# Patient Record
Sex: FEMALE | Race: WHITE | NOT HISPANIC OR LATINO | Employment: FULL TIME | URBAN - METROPOLITAN AREA
[De-identification: names, ages, dates, MRNs, and addresses within clinical notes are randomized per-mention and may not be internally consistent; named-entity substitution may affect disease eponyms.]

---

## 2017-03-27 ENCOUNTER — TELEPHONE (OUTPATIENT)
Dept: FAMILY MEDICINE | Facility: CLINIC | Age: 52
End: 2017-03-27

## 2017-03-27 DIAGNOSIS — Z12.11 SPECIAL SCREENING FOR MALIGNANT NEOPLASMS, COLON: Primary | ICD-10-CM

## 2017-03-27 DIAGNOSIS — Z12.11 COLON CANCER SCREENING: ICD-10-CM

## 2017-03-27 NOTE — TELEPHONE ENCOUNTER
Panel Management Review      Patient has the following on her problem list:       Composite cancer screening  Chart review shows that this patient is due/due soon for the following Fecal Colorectal (FIT)  Summary:    Patient is due/failing the following:   FIT    Action needed:   Patient needs referral/order: signed     Type of outreach:    Sent Tripda message.    Questions for provider review:    Please sign future lab order                                                                                                                                    Olya Winkler CMA       Chart routed to Provider .

## 2017-03-28 ENCOUNTER — OFFICE VISIT (OUTPATIENT)
Dept: OBGYN | Facility: CLINIC | Age: 52
End: 2017-03-28
Payer: COMMERCIAL

## 2017-03-28 VITALS
HEART RATE: 108 BPM | SYSTOLIC BLOOD PRESSURE: 113 MMHG | DIASTOLIC BLOOD PRESSURE: 78 MMHG | TEMPERATURE: 98.3 F | BODY MASS INDEX: 32.59 KG/M2 | HEIGHT: 60 IN | WEIGHT: 166 LBS

## 2017-03-28 DIAGNOSIS — R10.2 PELVIC PAIN IN FEMALE: Primary | ICD-10-CM

## 2017-03-28 PROCEDURE — 99213 OFFICE O/P EST LOW 20 MIN: CPT | Performed by: NURSE PRACTITIONER

## 2017-03-28 ASSESSMENT — PAIN SCALES - GENERAL: PAINLEVEL: MODERATE PAIN (4)

## 2017-03-28 NOTE — PROGRESS NOTES
S: Pt is a 51 year old  5 para 4 who scheduled an appointment today for an annual female exam, but is wanting evaluation for pelvic pain and will reschedule her AFE. Her pelvic pain has been intermittent x 1 year. It is generalized across the lower pelvis. Comes and goes, can be more prominent with walking, getting up from bed. Not related to many activities. Does not occur daily, but occurs more than weekly. No pattern to when it occurs. No other aggravating factors or alleviating factors. Not usually bothersome, just noticeable. Describes as cramping. No sharp, shooting, stabbing pains. Occasional NSAID use and that does help. Has a history of constipation since she was young, but knows that discomfort and it is different than this. Denies nausea, vomiting, appetite changes, bowel or urinary changes, abnormal vaginal symptoms. No vaginal bleeding. LMP was in . Does have a history of uterine fibroids. No contributory family history. Patient medical, surgical, social, and family history reviewed and updated at today's visit. ROS: 10 point ROS neg other than the symptoms noted above in the HPI.    O: This is a well appearing female in no acute distress. Answers questions and maintains eye contact appropriately. Vital signs noted.  RESPIRATORY: Clear to auscultation bilaterally.  CV: Regular rate and rhythm without murmur, gallop, rub  ABDOMEN: Soft, nondistended, normoactive bowel sounds. No hepatosplenomegaly. No guarding, rebounding, or rigidity. Mild right lower quadrant tenderness, no left sided symptoms.  Vulva: No external lesions, normal hair distribution, no adenopathy  BUS:  Normal, no masses noted  Vagina: Moist, pink, no abnormal discharge, well rugated, no lesions  Cervix: Pink, parous, midline. Without cervical motion tenderness.  Uterus: Normal size and shape, non-tender, mobile  Ovaries: No masses, tenderness present bilaterally-R>L, mobile    A/P:  (R10.2) Pelvic pain in female  (primary  encounter diagnosis)  Comment: We reviewed possible etiologies of her symptoms including that this may not be gynecologic in nature. Plan ultrasound for further evaluation and if normal, then recommend follow up with PCP. Continue NSAIDS PRN until ultrasound completed. Warning signs to monitor for and report immediately discussed with patient and she verbalizes understanding. Return to clinic at convenience for annual exam.  Plan: US Pelvic Complete with Transvaginal          Ramona WRIGHT CNP

## 2017-03-28 NOTE — NURSING NOTE
Chief Complaint   Patient presents with     Physical       Initial /78  Pulse 108  Temp 98.3  F (36.8  C) (Oral)  Ht 5' (1.524 m)  Wt 166 lb (75.3 kg)  LMP 11/05/2010  BMI 32.42 kg/m2 Estimated body mass index is 32.42 kg/(m^2) as calculated from the following:    Height as of this encounter: 5' (1.524 m).    Weight as of this encounter: 166 lb (75.3 kg)..  BP completed using cuff size: regular    Last pap : 03/04/2015 SHAYE Patel CMA

## 2017-03-28 NOTE — MR AVS SNAPSHOT
After Visit Summary   3/28/2017    Mag Salcedo    MRN: [id number]           Patient Information     Date Of Birth          1965        Visit Information        Provider Department      3/28/2017 4:10 PM Ramona Riley APRN CNP [facility]        Today's Diagnoses     Pelvic pain in female    -  1       Follow-ups after your visit        Future tests that were ordered for you today     Open Future Orders        Priority Expected Expires Ordered    US Pelvic Complete with Transvaginal Routine  6/26/2017 3/28/2017            Who to contact     If you have questions or need follow up information about today's clinic visit or your schedule please contact [facility] directly at [phone].  Normal or non-critical lab and imaging results will be communicated to you by Sponsiahart, letter or phone within 4 business days after the clinic has received the results. If you do not hear from us within 7 days, please contact the clinic through Sponsiahart or phone. If you have a critical or abnormal lab result, we will notify you by phone as soon as possible.  Submit refill requests through [employer] or call your pharmacy and they will forward the refill request to us. Please allow 3 business days for your refill to be completed.          Additional Information About Your Visit        MyChart Information     [employer] gives you secure access to your electronic health record. If you see a primary care provider, you can also send messages to your care team and make appointments. If you have questions, please call your primary care clinic.  If you do not have a primary care provider, please call [phone] and they will assist you.        Care EveryWhere ID     This is your Care EveryWhere ID. This could be used by other organizations to access your [place] medical records  [id number]        Your Vitals Were     Pulse Temperature Height Last Period BMI (Body Mass Index)       108  98.3  F (36.8  C) (Oral) 5' (1.524 m) 11/05/2010 32.42 kg/m2        Blood Pressure from Last 3 Encounters:   03/28/17 113/78   11/01/16 127/83   04/16/16 102/77    Weight from Last 3 Encounters:   03/28/17 166 lb (75.3 kg)   11/01/16 168 lb (76.2 kg)   04/16/16 169 lb (76.7 kg)                 Today's Medication Changes          These changes are accurate as of: 3/28/17 11:59 PM.  If you have any questions, ask your nurse or doctor.               Stop taking these medicines if you haven't already. Please contact your care team if you have questions.     amitriptyline 25 MG tablet   Commonly known as:  ELAVIL   Stopped by:  Ramona Riley APRN CNP                    Primary Care Provider Office Phone # Fax #    ePdro Dilshad Vela [phone]       [facility] [address] [facility] [address]        Thank you!     Thank you for choosing [facility]  for your care. Our goal is always to provide you with excellent care. Hearing back from our patients is one way we can continue to improve our services. Please take a few minutes to complete the written survey that you may receive in the mail after your visit with us. Thank you!             Your Updated Medication List - Protect others around you: Learn how to safely use, store and throw away your medicines at www.disposemymeds.org.          This list is accurate as of: 3/28/17 11:59 PM.  Always use your most recent med list.                   Brand Name Dispense Instructions for use    buPROPion 100 MG 12 hr tablet    WELLBUTRIN SR    360 tablet    Take 2 tablets (200 mg) by mouth 2 times daily For depression       * clonazePAM 1 MG tablet    klonoPIN    8 tablet    Take 1 tablet (1 mg) by mouth nightly as needed for anxiety Need to be seen for more       * clonazePAM 1 MG tablet    klonoPIN    30 tablet    Take 1 tablet (1 mg) by mouth nightly as needed for anxiety Max#7/week       * Notice:  This list has 2  medication(s) that are the same as other medications prescribed for you. Read the directions carefully, and ask your doctor or other care provider to review them with you.

## 2017-04-04 PROCEDURE — 82274 ASSAY TEST FOR BLOOD FECAL: CPT | Performed by: FAMILY MEDICINE

## 2017-04-06 DIAGNOSIS — Z12.11 COLON CANCER SCREENING: ICD-10-CM

## 2017-04-06 LAB — HEMOCCULT STL QL IA: NEGATIVE

## 2017-05-15 ENCOUNTER — RADIANT APPOINTMENT (OUTPATIENT)
Dept: ULTRASOUND IMAGING | Facility: CLINIC | Age: 52
End: 2017-05-15
Attending: NURSE PRACTITIONER
Payer: COMMERCIAL

## 2017-05-15 DIAGNOSIS — R10.2 PELVIC PAIN IN FEMALE: ICD-10-CM

## 2017-05-15 PROCEDURE — 76856 US EXAM PELVIC COMPLETE: CPT

## 2017-05-22 ENCOUNTER — ALLIED HEALTH/NURSE VISIT (OUTPATIENT)
Dept: NURSING | Facility: CLINIC | Age: 52
End: 2017-05-22
Payer: COMMERCIAL

## 2017-05-22 DIAGNOSIS — H61.23 BILATERAL IMPACTED CERUMEN: Primary | ICD-10-CM

## 2017-05-22 PROCEDURE — 69209 REMOVE IMPACTED EAR WAX UNI: CPT | Mod: 50

## 2017-05-22 NOTE — PROGRESS NOTES
Mag Salcedo is a 51 year old female who presents in clinic for possible impacted cerumen.    SYMPTOMS:  Onset of symptoms: days ago with sudden onset and gradual onset    Hx of cerumen impaction?   Yes  Hx of surgery or rupture?  No (if yes, huddle with provider)  OBJECTIVE:  Patient appears in no distress  HEENT: both sides ear canal occluded with cerumen.      RN check prior to irrigation:Kathleen Roman RN  RN check post irrigation: Kathleen Roman RN    PLAN:  Cerumenosis is noted Wax is removed by syringing with Elephant Ear Wash and manual debridement with 1/2 strength water and 1/2 strength H202       Instructions for home care to prevent wax buildup are given, including OTC agents  Tilt head sideways and instill 5-10 drops twice daily up to 4 days, tip of applicator should not enter ear canal; keep drops in ear for several minutes by keeping head tilted and placing cotton in ear.    NURSING PLAN:  Unable to remove wax.    RECOMMENDED DISPOSITION:    Will comply with recommendation:   Violette Hinojosa MA            Unable to remove the ear wax from either side after numerous attempts by MA as per above.  Patient instructed NOT to use Q tips.  She was having daughter move wax side to side with qtip so she could hear.  Told her nothing smaller than her thumb in ear.  Instructed to use the OTC ear drops per  instructions for next 5-7 days and then make appointment to come back to try to remove the wax once it's softened up.  She states that this has been an ongoing issue and that OTC ear drops for wax removal don't work for her.  Did tell her she could talk with her pcp about referring her to ENT for the build up of cerumen in her ears.  Kathleen Roman RN

## 2017-05-22 NOTE — MR AVS SNAPSHOT
After Visit Summary   5/22/2017    Mag Salcedo    MRN: [id number]           Patient Information     Date Of Birth          1965        Visit Information        Provider Department      5/22/2017 5:30 PM AN ANCILLARY [facility]        Today's Diagnoses     Bilateral impacted cerumen    -  1       Follow-ups after your visit        Who to contact     If you have questions or need follow up information about today's clinic visit or your schedule please contact [facility] directly at [phone].  Normal or non-critical lab and imaging results will be communicated to you by MyChart, letter or phone within 4 business days after the clinic has received the results. If you do not hear from us within 7 days, please contact the clinic through Planexhart or phone. If you have a critical or abnormal lab result, we will notify you by phone as soon as possible.  Submit refill requests through [employer] or call your pharmacy and they will forward the refill request to us. Please allow 3 business days for your refill to be completed.          Additional Information About Your Visit        MyChart Information     [employer] gives you secure access to your electronic health record. If you see a primary care provider, you can also send messages to your care team and make appointments. If you have questions, please call your primary care clinic.  If you do not have a primary care provider, please call [phone] and they will assist you.        Care EveryWhere ID     This is your Care EveryWhere ID. This could be used by other organizations to access your [place] medical records  [id number]        Your Vitals Were     Last Period                   11/05/2010            Blood Pressure from Last 3 Encounters:   05/23/17 113/81   03/28/17 113/78   11/01/16 127/83    Weight from Last 3 Encounters:   05/23/17 170 lb (77.1 kg)   03/28/17 166 lb (75.3 kg)   11/01/16 168 lb (76.2 kg)               Today, you had the following     No orders found for display       Primary Care Provider Office Phone # Fax #    Pedro Vela [phone]       [facility] [address] [facility] [address]        Thank you!     Thank you for choosing [facility]  for your care. Our goal is always to provide you with excellent care. Hearing back from our patients is one way we can continue to improve our services. Please take a few minutes to complete the written survey that you may receive in the mail after your visit with us. Thank you!             Your Updated Medication List - Protect others around you: Learn how to safely use, store and throw away your medicines at www.disposemymeds.org.      Notice  As of 5/22/2017 11:59 PM    You have not been prescribed any medications.

## 2017-05-23 ENCOUNTER — OFFICE VISIT (OUTPATIENT)
Dept: FAMILY MEDICINE | Facility: CLINIC | Age: 52
End: 2017-05-23
Payer: COMMERCIAL

## 2017-05-23 VITALS
HEIGHT: 60 IN | BODY MASS INDEX: 33.38 KG/M2 | OXYGEN SATURATION: 96 % | TEMPERATURE: 98.2 F | HEART RATE: 103 BPM | DIASTOLIC BLOOD PRESSURE: 81 MMHG | SYSTOLIC BLOOD PRESSURE: 113 MMHG | WEIGHT: 170 LBS

## 2017-05-23 DIAGNOSIS — F33.2 MAJOR DEPRESSIVE DISORDER, RECURRENT, SEVERE WITHOUT PSYCHOTIC FEATURES (H): ICD-10-CM

## 2017-05-23 DIAGNOSIS — H53.9 VISION CHANGES: Primary | ICD-10-CM

## 2017-05-23 DIAGNOSIS — G47.09 OTHER INSOMNIA: ICD-10-CM

## 2017-05-23 DIAGNOSIS — F41.8 DEPRESSION WITH ANXIETY: ICD-10-CM

## 2017-05-23 DIAGNOSIS — H61.23 BILATERAL IMPACTED CERUMEN: ICD-10-CM

## 2017-05-23 PROCEDURE — 99214 OFFICE O/P EST MOD 30 MIN: CPT | Performed by: FAMILY MEDICINE

## 2017-05-23 RX ORDER — CLONAZEPAM 1 MG/1
1 TABLET ORAL 2 TIMES DAILY PRN
Qty: 38 TABLET | Refills: 5 | Status: SHIPPED | OUTPATIENT
Start: 2017-05-23 | End: 2017-11-07

## 2017-05-23 RX ORDER — BUPROPION HYDROCHLORIDE 100 MG/1
200 TABLET, EXTENDED RELEASE ORAL 2 TIMES DAILY
Qty: 360 TABLET | Refills: 1 | Status: SHIPPED | OUTPATIENT
Start: 2017-05-23 | End: 2017-11-07

## 2017-05-23 ASSESSMENT — ANXIETY QUESTIONNAIRES
7. FEELING AFRAID AS IF SOMETHING AWFUL MIGHT HAPPEN: SEVERAL DAYS
2. NOT BEING ABLE TO STOP OR CONTROL WORRYING: SEVERAL DAYS
5. BEING SO RESTLESS THAT IT IS HARD TO SIT STILL: SEVERAL DAYS
GAD7 TOTAL SCORE: 7
IF YOU CHECKED OFF ANY PROBLEMS ON THIS QUESTIONNAIRE, HOW DIFFICULT HAVE THESE PROBLEMS MADE IT FOR YOU TO DO YOUR WORK, TAKE CARE OF THINGS AT HOME, OR GET ALONG WITH OTHER PEOPLE: SOMEWHAT DIFFICULT
1. FEELING NERVOUS, ANXIOUS, OR ON EDGE: SEVERAL DAYS
6. BECOMING EASILY ANNOYED OR IRRITABLE: SEVERAL DAYS
3. WORRYING TOO MUCH ABOUT DIFFERENT THINGS: SEVERAL DAYS

## 2017-05-23 ASSESSMENT — PATIENT HEALTH QUESTIONNAIRE - PHQ9: 5. POOR APPETITE OR OVEREATING: SEVERAL DAYS

## 2017-05-23 NOTE — MR AVS SNAPSHOT
After Visit Summary   5/23/2017    Mag Salcedo    MRN: [id number]           Patient Information     Date Of Birth          1965        Visit Information        Provider Department      5/23/2017 6:10 PM Pedro Vela MD [facility]        Today's Diagnoses     Vision changes    -  1    Depression with anxiety        Other insomnia        Major depressive disorder, recurrent, severe without psychotic features (H)        Bilateral impacted cerumen           Follow-ups after your visit        Additional Services     OPHTHALMOLOGY ADULT REFERRAL       Your provider has referred you to: G: [facility] [phone]   [email or web address]    Please be aware that coverage of these services is subject to the terms and limitations of your health insurance plan.  Call member services at your health plan with any benefit or coverage questions.      Please bring the following with you to your appointment:    (1) Any X-Rays, CTs or MRIs which have been performed.  Contact the facility where they were done to arrange for  prior to your scheduled appointment.    (2) List of current medications  (3) This referral request   (4) Any documents/labs given to you for this referral            OTOLARYNGOLOGY REFERRAL       Your provider has referred you to: seen by rosales zheng  Please be aware that coverage of these services is subject to the terms and limitations of your health insurance plan.  Call member services at your health plan with any benefit or coverage questions.      Please bring the following with you to your appointment:    (1) Any X-Rays, CTs or MRIs which have been performed.  Contact the facility where they were done to arrange for  prior to your scheduled appointment.   (2) List of current medications  (3) This referral request   (4) Any documents/labs given to you for this referral                  Who to  contact     If you have questions or need follow up information about today's clinic visit or your schedule please contact [facility] directly at [phone].  Normal or non-critical lab and imaging results will be communicated to you by MyChart, letter or phone within 4 business days after the clinic has received the results. If you do not hear from us within 7 days, please contact the clinic through Flipboardhart or phone. If you have a critical or abnormal lab result, we will notify you by phone as soon as possible.  Submit refill requests through [employer] or call your pharmacy and they will forward the refill request to us. Please allow 3 business days for your refill to be completed.          Additional Information About Your Visit        FlipboardharYozio Information     [employer] gives you secure access to your electronic health record. If you see a primary care provider, you can also send messages to your care team and make appointments. If you have questions, please call your primary care clinic.  If you do not have a primary care provider, please call [phone] and they will assist you.        Care EveryWhere ID     This is your Care EveryWhere ID. This could be used by other organizations to access your [place] medical records  [id number]        Your Vitals Were     Pulse Temperature Height Last Period Pulse Oximetry BMI (Body Mass Index)    103 98.2  F (36.8  C) (Oral) 5' (1.524 m) 11/05/2010 96% 33.2 kg/m2       Blood Pressure from Last 3 Encounters:   05/23/17 113/81   03/28/17 113/78   11/01/16 127/83    Weight from Last 3 Encounters:   05/23/17 170 lb (77.1 kg)   03/28/17 166 lb (75.3 kg)   11/01/16 168 lb (76.2 kg)              We Performed the Following     DEPRESSION ACTION PLAN (DAP)     OPHTHALMOLOGY ADULT REFERRAL     OTOLARYNGOLOGY REFERRAL          Today's Medication Changes          These changes are accurate as of: 5/23/17  6:31 PM.  If you have any questions, ask your nurse or doctor.                These medicines have changed or have updated prescriptions.        Dose/Directions    clonazePAM 1 MG tablet   Commonly known as:  klonoPIN   This may have changed:    - when to take this  - additional instructions   Used for:  Other insomnia, Major depressive disorder, recurrent, severe without psychotic features (H)        Dose:  1 mg   Take 1 tablet (1 mg) by mouth 2 times daily as needed for anxiety Max#9/week   Quantity:  38 tablet   Refills:  5            Where to get your medicines      These medications were sent to [id number] - [place], [place] - [address]  [address], [place] [address]     Phone:  [phone]     buPROPion 100 MG 12 hr tablet         Some of these will need a paper prescription and others can be bought over the counter.  Ask your nurse if you have questions.     Bring a paper prescription for each of these medications     clonazePAM 1 MG tablet                Primary Care Provider Office Phone # Fax #    Pedro Vela [phone]       [facility] [address] [facility] [address]        Thank you!     Thank you for choosing [facility]  for your care. Our goal is always to provide you with excellent care. Hearing back from our patients is one way we can continue to improve our services. Please take a few minutes to complete the written survey that you may receive in the mail after your visit with us. Thank you!             Your Updated Medication List - Protect others around you: Learn how to safely use, store and throw away your medicines at www.disposemymeds.org.          This list is accurate as of: 5/23/17  6:31 PM.  Always use your most recent med list.                   Brand Name Dispense Instructions for use    buPROPion 100 MG 12 hr tablet    WELLBUTRIN SR    360 tablet    Take 2 tablets (200 mg) by mouth 2 times daily For depression       clonazePAM 1 MG tablet    klonoPIN    38 tablet    Take 1 tablet (1 mg) by  mouth 2 times daily as needed for anxiety Max#9/week

## 2017-05-23 NOTE — PROGRESS NOTES
SUBJECTIVE:  Mag Salcedo, a 51 year old female scheduled an appointment to discuss the following issues:  Follow-up depression/anxiety and insomnia.  Patient NOT interested in colonoscopy but did mail in fit card. Home ok. Klonopin x5/week and elavil prn   Insurance now. Sister with breast cancer. No breast changes - noted. History lump.   Klonopin occasionally 1/2 pill in am.   No ALCOHOL. No illicit drugs. No dating - but more active. Family ok.   Klonopin helpful with sleep. Caffeine 2 cups coffee in AM.   Summer/winter - no changes.   Past Medical History:   Diagnosis Date     History of cervical dysplasia        No past surgical history on file.    Family History   Problem Relation Age of Onset     Coronary Artery Disease Mother      heart attack in sleep     Breast Cancer Sister        Social History   Substance Use Topics     Smoking status: Never Smoker     Smokeless tobacco: Never Used     Alcohol use Yes      Comment: occ       ROS:    OBJECTIVE:  /81  Pulse 103  Temp 98.2  F (36.8  C) (Oral)  Ht 5' (1.524 m)  Wt 170 lb (77.1 kg)  LMP 11/05/2010  SpO2 96%  BMI 33.2 kg/m2  EXAM:  GENERAL APPEARANCE: healthy, alert and no distress  EYES: EOMI,  PERRL  HENT: ear canals and TM's normal and nose and mouth without ulcers or lesions  NECK: no adenopathy, no asymmetry, masses, or scars and thyroid normal to palpation  RESP: lungs clear to auscultation - no rales, rhonchi or wheezes  CV: regular rates and rhythm, normal S1 S2, no S3 or S4 and no murmur, click or rub -  ABDOMEN:  soft, nontender, no HSM or masses and bowel sounds normal  MS: extremities normal- no gross deformities noted, no evidence of inflammation in joints, FROM in all extremities.  PSYCH: mentation appears normal and affect normal/bright  PSYCH: mildly anxious    ASSESSMENT / PLAN:  (F41.8) Depression with anxiety  Comment: stable  Plan: buPROPion (WELLBUTRIN SR) 100 MG 12 hr tablet        Exercise. Home/work ok and now with  insuracne. If SUICIAL IDEATION OR HOMOCIDAL IDEATION OR PATRICK TO ER.. Recheck in 6 months  Sooner if worse    (G47.09) Other insomnia  Comment: stable  Plan: clonazePAM (KLONOPIN) 1 MG tablet            (F33.2) Major depressive disorder, recurrent, severe without psychotic features (H)  Plan: clonazePAM (KLONOPIN) 1 MG tablet        Would like occasionally 1/2 pill daytime too. Up form 7 to 9 week. Reveiwed risks and side effects of medication  Continue avoid ALCOHOL.    Advised mammogram set-up in next month.   (H53.9) Vision changes  (primary encounter diagnosis)  Plan: OPHTHALMOLOGY ADULT REFERRAL   wears glasses and interested in possible lasix. Needs eye exam    (H61.23) Bilateral impacted cerumen  Comment:  Plan: OTOLARYNGOLOGY REFERRAL        Seen ENT rosales erickson already after failing lavage. Referral placed.           Pedro Vela

## 2017-05-23 NOTE — NURSING NOTE
Chief Complaint   Patient presents with     Depression       Initial /81  Pulse 103  Temp 98.2  F (36.8  C) (Oral)  Ht 5' (1.524 m)  Wt 170 lb (77.1 kg)  LMP 11/05/2010  SpO2 96%  BMI 33.2 kg/m2 Estimated body mass index is 33.2 kg/(m^2) as calculated from the following:    Height as of this encounter: 5' (1.524 m).    Weight as of this encounter: 170 lb (77.1 kg).  Medication Reconciliation: complete   Olya Winkler, CMA

## 2017-05-24 ASSESSMENT — ANXIETY QUESTIONNAIRES: GAD7 TOTAL SCORE: 7

## 2017-05-24 ASSESSMENT — PATIENT HEALTH QUESTIONNAIRE - PHQ9: SUM OF ALL RESPONSES TO PHQ QUESTIONS 1-9: 4

## 2017-08-17 ENCOUNTER — OFFICE VISIT (OUTPATIENT)
Dept: URGENT CARE | Facility: URGENT CARE | Age: 52
End: 2017-08-17
Payer: COMMERCIAL

## 2017-08-17 ENCOUNTER — TELEPHONE (OUTPATIENT)
Dept: URGENT CARE | Facility: URGENT CARE | Age: 52
End: 2017-08-17

## 2017-08-17 VITALS
HEART RATE: 102 BPM | WEIGHT: 169 LBS | OXYGEN SATURATION: 97 % | DIASTOLIC BLOOD PRESSURE: 87 MMHG | SYSTOLIC BLOOD PRESSURE: 129 MMHG | BODY MASS INDEX: 33.01 KG/M2 | TEMPERATURE: 98 F | RESPIRATION RATE: 15 BRPM

## 2017-08-17 DIAGNOSIS — M54.50 ACUTE RIGHT-SIDED LOW BACK PAIN WITHOUT SCIATICA: Primary | ICD-10-CM

## 2017-08-17 PROCEDURE — 99214 OFFICE O/P EST MOD 30 MIN: CPT | Performed by: NURSE PRACTITIONER

## 2017-08-17 RX ORDER — METHYLPREDNISOLONE 4 MG
TABLET, DOSE PACK ORAL
Qty: 21 TABLET | Refills: 0 | Status: SHIPPED | OUTPATIENT
Start: 2017-08-17 | End: 2018-05-31

## 2017-08-17 RX ORDER — HYDROCODONE BITARTRATE AND ACETAMINOPHEN 5; 325 MG/1; MG/1
1 TABLET ORAL EVERY 6 HOURS PRN
Qty: 12 TABLET | Refills: 0 | Status: SHIPPED | OUTPATIENT
Start: 2017-08-17 | End: 2017-08-20

## 2017-08-17 ASSESSMENT — PAIN SCALES - GENERAL: PAINLEVEL: SEVERE PAIN (7)

## 2017-08-17 NOTE — TELEPHONE ENCOUNTER
Patient calling states she was seen for back pain in urgent care this pm, was given a script for norco. States this happened in past and she was given a script for prednisone as well and that really helped. Patient is wondering if she can get a script for prednisone. Please call to discuss.

## 2017-08-17 NOTE — PATIENT INSTRUCTIONS
Back Care Tips    Caring for your back  These are things you can do to prevent a recurrence of acute back pain and to reduce symptoms from chronic back pain:    Maintain a healthy weight. If you are overweight, losing weight will help most types of back pain.    Exercise is an important part of recovery from most types of back pain. The muscles behind and in front of the spine support the back. This means strengthening both the back muscles and the abdominal muscles will provide better support for your spine.     Swimming and brisk walking are good overall exercises to improve your fitness level.    Practice safe lifting methods (below).    Practice good posture when sitting, standing and walking. Avoid prolonged sitting. This puts more stress on the lower back than standing or walking.    Wear quality shoes with sufficient arch support. Foot and ankle alignment can affect back symptoms. Women should avoid wearing high heels.    Therapeutic massage can help relax the back muscles without stretching them.    During the first 24 to 72 hours after an acute injury or flare-up of chronic back pain, apply an ice pack to the painful area for 20 minutes and then remove it for 20 minutes, over a period of 60 to 90 minutes, or several times a day. As a safety precaution, do not use a heating pad at bedtime. Sleeping on a heating pad can lead to skin burns or tissue damage.    You can alternate ice and heat therapies.  Medications  Talk to your healthcare provider before using medicines, especially if you have other medical problems or are taking other medicines.    You may use acetaminophen or ibuprofen to control pain, unless your healthcare provider prescribed other pain medicine. If you have chronic conditions like diabetes, liver or kidney disease, stomach ulcers, or gastrointestinal bleeding, or are taking blood thinners, talk with your healthcare provider before taking any medicines.    Be careful if you are given  prescription pain medicines, narcotics, or medicine for muscle spasm. They can cause drowsiness, affect your coordination, reflexes, and judgment. Do not drive or operate heavy machinery while taking these types of medicines. Take prescription pain medicine only as prescribed by your healthcare provider.  Lumbar stretch  Here is a simple stretching exercise that will help relax muscle spasm and keep your back more limber. If exercise makes your back pain worse, don t do it.    Lie on your back with your knees bent and both feet on the ground.    Slowly raise your left knee to your chest as you flatten your lower back against the floor. Hold for 5 seconds.    Relax and repeat the exercise with your right knee.    Do 10 of these exercises for each leg.  Safe lifting method    Don t bend over at the waist to lift an object off the floor.  Instead, bend your knees and hips in a squat.     Keep your back and head upright    Hold the object close to your body, directly in front of you.    Straighten your legs to lift the object.     Lower the object to the floor in the reverse fashion.    If you must slide something across the floor, push it.  Posture tips  Sitting  Sit in chairs with straight backs or low-back support. Keep your knees lower than your hips, with your feet flat on the floor.  When driving, sit up straight. Adjust the seat forward so you are not leaning toward the steering wheel.  A small pillow or rolled towel behind your lower back may help if you are driving long distances.   Standing  When standing for long periods, shift most of your weight to one leg at a time. Alternate legs every few minutes.   Sleeping  The best way to sleep is on your side with your knees bent. Put a low pillow under your head to support your neck in a neutral spine position. Avoid thick pillows that bend your neck to one side. Put a pillow between your legs to further relax your lower back. If you sleep on your back, put pillows  under your knees to support your legs in a slightly flexed position. Use a firm mattress. If your mattress sags, replace it, or use a 1/2-inch plywood board under the mattress to add support.  Follow-up care  Follow up with your healthcare provider, or as advised.  If X-rays, a CT scan or an MRI scan were taken, they will be reviewed by a radiologist. You will be notified of any new findings that may affect your care.  Call 911  Seek emergency medical care if any of the following occur:    Trouble breathing    Confusion    Very drowsy    Fainting or loss of consciousness    Rapid or very slow heart rate    Loss of  bowel or bladder control  When to seek medical care  Call your healthcare provider if any of the following occur:    Pain becomes worse or spreads to your arms or legs    Weakness or numbness in one or both arms or legs    Numbness in the groin area  Date Last Reviewed: 6/1/2016 2000-2017 [employer]. [address] [place] [address]. All rights reserved. This information is not intended as a substitute for professional medical care. Always follow your healthcare professional's instructions.

## 2017-08-17 NOTE — NURSING NOTE
Chief Complaint   Patient presents with     Back Pain     c/o back pain on right side x 1 week       Initial /87  Pulse 102  Temp 98  F (36.7  C) (Oral)  Resp 15  Wt 169 lb (76.7 kg)  LMP 11/05/2010  SpO2 97%  Breastfeeding? No  BMI 33.01 kg/m2 Estimated body mass index is 33.01 kg/(m^2) as calculated from the following:    Height as of 5/23/17: 5' (1.524 m).    Weight as of this encounter: 169 lb (76.7 kg).  Medication Reconciliation: complete   Issac Bob MA

## 2017-08-17 NOTE — PROGRESS NOTES
SUBJECTIVE:                                                    Mag Salcedo is a 51 year old female who presents to clinic today for the following health issues:      Back Pain       Duration: 1 week        Specific cause: exercising     Description:   Location of pain: low back right and middle of back right  Character of pain: sharp and burning  Pain radiation:radiates into the right buttocks  New numbness or weakness in legs, not attributed to pain:  no     Intensity: severe    History:   Pain interferes with job: No  History of back problems: recurrent self limited episodes of low back pain in the past  Any previous MRI or X-rays: None  Sees a specialist for back pain:  No  Therapies tried without relief: cold and heat    Alleviating factors:   Improved by: none      Precipitating factors:  Worsened by: Lifting, Bending, Standing and Sitting    Functional and Psychosocial Screen (Val STarT Back):      Not performed today          Accompanying Signs & Symptoms:  Risk of Fracture:  None  Risk of Cauda Equina:  None  Risk of Infection:  None  Risk of Cancer:  None  Risk of Ankylosing Spondylitis:  Onset at age <35, male, AND morning back stiffness. no         Allergies   Allergen Reactions     Effexor [Venlafaxine]      nausea       Past Medical History:   Diagnosis Date     History of cervical dysplasia          Current Outpatient Prescriptions on File Prior to Visit:  buPROPion (WELLBUTRIN SR) 100 MG 12 hr tablet Take 2 tablets (200 mg) by mouth 2 times daily For depression   clonazePAM (KLONOPIN) 1 MG tablet Take 1 tablet (1 mg) by mouth 2 times daily as needed for anxiety Max#9/week     No current facility-administered medications on file prior to visit.     History reviewed. No pertinent surgical history.    Social History     Social History     Marital status: Unknown     Spouse name: N/A     Number of children: N/A     Years of education: N/A     Occupational History     Not on file.     Social  History Main Topics     Smoking status: Never Smoker     Smokeless tobacco: Never Used     Alcohol use Yes      Comment: occ     Drug use: No     Sexual activity: Not Currently     Partners: Male     Birth control/ protection: Post-menopausal     Other Topics Concern     Not on file     Social History Narrative       REVIEW OF SYSTEMS  General: negative for fever  Resp: negative for chest pain   CV: negative for chest pain  : negative for dysuria , incontinence, frequency  Musculoskeletal: as above  Neurologic: negative for ataxia, saddle anesthesia, fecal incontinence, one sided weakness,  paresthesias    Physical Exam:  Vitals: /87  Pulse 102  Temp 98  F (36.7  C) (Oral)  Resp 15  Wt 169 lb (76.7 kg)  LMP 11/05/2010  SpO2 97%  Breastfeeding? No  BMI 33.01 kg/m2  BMI= Body mass index is 33.01 kg/(m^2).  Constitutional: healthy, alert and no acute distress   CARDIO: RRR, no MRG  RESP: lungs CTA, NAD  NEURO: Patellar reflexes intact and equal b/l  BACK:  Straight leg raise positive with lifting right foot above 45 degrees, tender right lumbar paraspinal muscle on palpation,  TTP, strength intact and equal b/l lower extremities. No swelling or bruising noted on inspection.  GAIT: intact  Psychiatric: mentation appears normal and affect normal/bright      Impression: Back pain, uncomplicated.     ICD-10-CM    1. Acute right-sided low back pain without sciatica M54.5 HYDROcodone-acetaminophen (NORCO) 5-325 MG per tablet     methylPREDNISolone (MEDROL DOSEPAK) 4 MG tablet         Plan:  Advised rest, to try not to overexercise or strain.  Apply ice for 15-20 minutes intermittently as needed and especially after any offending activity. Daily stretching.  As pain recedes, begin normal activities slowly as tolerated.  Consider Physical Therapy if symptoms not better with symptomatic care. Instructions for back care and return precautions discussed.  Follow up with PCP.  Oly Urias  [facility]  Family Nurse  [employer]

## 2017-08-17 NOTE — MR AVS SNAPSHOT
After Visit Summary   8/17/2017    Mag Salcedo    MRN: [id number]           Patient Information     Date Of Birth          1965        Visit Information        Provider Department      8/17/2017 5:10 PM Oly Urias NP [facility]        Today's Diagnoses     Acute right-sided low back pain without sciatica    -  1      Care Instructions      Back Care Tips    Caring for your back  These are things you can do to prevent a recurrence of acute back pain and to reduce symptoms from chronic back pain:    Maintain a healthy weight. If you are overweight, losing weight will help most types of back pain.    Exercise is an important part of recovery from most types of back pain. The muscles behind and in front of the spine support the back. This means strengthening both the back muscles and the abdominal muscles will provide better support for your spine.     Swimming and brisk walking are good overall exercises to improve your fitness level.    Practice safe lifting methods (below).    Practice good posture when sitting, standing and walking. Avoid prolonged sitting. This puts more stress on the lower back than standing or walking.    Wear quality shoes with sufficient arch support. Foot and ankle alignment can affect back symptoms. Women should avoid wearing high heels.    Therapeutic massage can help relax the back muscles without stretching them.    During the first 24 to 72 hours after an acute injury or flare-up of chronic back pain, apply an ice pack to the painful area for 20 minutes and then remove it for 20 minutes, over a period of 60 to 90 minutes, or several times a day. As a safety precaution, do not use a heating pad at bedtime. Sleeping on a heating pad can lead to skin burns or tissue damage.    You can alternate ice and heat therapies.  Medications  Talk to your healthcare provider before using medicines, especially if you have other medical problems or are taking other  medicines.    You may use acetaminophen or ibuprofen to control pain, unless your healthcare provider prescribed other pain medicine. If you have chronic conditions like diabetes, liver or kidney disease, stomach ulcers, or gastrointestinal bleeding, or are taking blood thinners, talk with your healthcare provider before taking any medicines.    Be careful if you are given prescription pain medicines, narcotics, or medicine for muscle spasm. They can cause drowsiness, affect your coordination, reflexes, and judgment. Do not drive or operate heavy machinery while taking these types of medicines. Take prescription pain medicine only as prescribed by your healthcare provider.  Lumbar stretch  Here is a simple stretching exercise that will help relax muscle spasm and keep your back more limber. If exercise makes your back pain worse, don t do it.    Lie on your back with your knees bent and both feet on the ground.    Slowly raise your left knee to your chest as you flatten your lower back against the floor. Hold for 5 seconds.    Relax and repeat the exercise with your right knee.    Do 10 of these exercises for each leg.  Safe lifting method    Don t bend over at the waist to lift an object off the floor.  Instead, bend your knees and hips in a squat.     Keep your back and head upright    Hold the object close to your body, directly in front of you.    Straighten your legs to lift the object.     Lower the object to the floor in the reverse fashion.    If you must slide something across the floor, push it.  Posture tips  Sitting  Sit in chairs with straight backs or low-back support. Keep your knees lower than your hips, with your feet flat on the floor.  When driving, sit up straight. Adjust the seat forward so you are not leaning toward the steering wheel.  A small pillow or rolled towel behind your lower back may help if you are driving long distances.   Standing  When standing for long periods, shift most of your  weight to one leg at a time. Alternate legs every few minutes.   Sleeping  The best way to sleep is on your side with your knees bent. Put a low pillow under your head to support your neck in a neutral spine position. Avoid thick pillows that bend your neck to one side. Put a pillow between your legs to further relax your lower back. If you sleep on your back, put pillows under your knees to support your legs in a slightly flexed position. Use a firm mattress. If your mattress sags, replace it, or use a 1/2-inch plywood board under the mattress to add support.  Follow-up care  Follow up with your healthcare provider, or as advised.  If X-rays, a CT scan or an MRI scan were taken, they will be reviewed by a radiologist. You will be notified of any new findings that may affect your care.  Call 911  Seek emergency medical care if any of the following occur:    Trouble breathing    Confusion    Very drowsy    Fainting or loss of consciousness    Rapid or very slow heart rate    Loss of  bowel or bladder control  When to seek medical care  Call your healthcare provider if any of the following occur:    Pain becomes worse or spreads to your arms or legs    Weakness or numbness in one or both arms or legs    Numbness in the groin area  Date Last Reviewed: 6/1/2016 2000-2017 [employer]. [address] [place] [address]. All rights reserved. This information is not intended as a substitute for professional medical care. Always follow your healthcare professional's instructions.                Follow-ups after your visit        Who to contact     If you have questions or need follow up information about today's clinic visit or your schedule please contact [facility] directly at [phone].  Normal or non-critical lab and imaging results will be communicated to you by MyChart, letter or phone within 4 business days after the clinic has received the results. If you do not hear from us  within 7 days, please contact the clinic through [employer] or phone. If you have a critical or abnormal lab result, we will notify you by phone as soon as possible.  Submit refill requests through [employer] or call your pharmacy and they will forward the refill request to us. Please allow 3 business days for your refill to be completed.          Additional Information About Your Visit        [email or web address] Information     [employer] gives you secure access to your electronic health record. If you see a primary care provider, you can also send messages to your care team and make appointments. If you have questions, please call your primary care clinic.  If you do not have a primary care provider, please call [phone] and they will assist you.        Care EveryWhere ID     This is your Care EveryWhere ID. This could be used by other organizations to access your [facility] medical records  [id number]        Your Vitals Were     Pulse Temperature Respirations Last Period Pulse Oximetry Breastfeeding?    102 98  F (36.7  C) (Oral) 15 11/05/2010 97% No    BMI (Body Mass Index)                   33.01 kg/m2            Blood Pressure from Last 3 Encounters:   08/17/17 129/87   05/23/17 113/81   03/28/17 113/78    Weight from Last 3 Encounters:   08/17/17 169 lb (76.7 kg)   05/23/17 170 lb (77.1 kg)   03/28/17 166 lb (75.3 kg)              Today, you had the following     No orders found for display         Today's Medication Changes          These changes are accurate as of: 8/17/17  5:42 PM.  If you have any questions, ask your nurse or doctor.               Start taking these medicines.        Dose/Directions    HYDROcodone-acetaminophen 5-325 MG per tablet   Commonly known as:  NORCO   Used for:  Acute right-sided low back pain without sciatica   Started by:  Oly Urias NP        Dose:  1 tablet   Take 1 tablet by mouth every 6 hours as needed for moderate to severe pain or pain maximum 4 tablet(s) per day   Quantity:  12 tablet    Refills:  0            Where to get your medicines      Some of these will need a paper prescription and others can be bought over the counter.  Ask your nurse if you have questions.     Bring a paper prescription for each of these medications     HYDROcodone-acetaminophen 5-325 MG per tablet                Primary Care Provider Office Phone # Fax #    Pedro Dilshad Vela [phone] [facility] [address]        Equal Access to Services     SIOBHAN ESPINOZA : Hadii aad ku hadasho Soomaali, waaxda luqadaha, qaybta kaalmada adeegyada, waxay idiin hayaan adeeg jerish layanni . So [facility] [phone].    ATENCIÓN: Si habla español, tiene a mendoza disposición servicios gratuitos de asistencia lingüística. Renatoame al [phone].    We comply with applicable federal civil rights laws and [place] laws. We do not discriminate on the basis of race, color, national origin, age, disability sex, sexual orientation or gender identity.            Thank you!     Thank you for choosing [facility]  for your care. Our goal is always to provide you with excellent care. Hearing back from our patients is one way we can continue to improve our services. Please take a few minutes to complete the written survey that you may receive in the mail after your visit with us. Thank you!             Your Updated Medication List - Protect others around you: Learn how to safely use, store and throw away your medicines at www.disposemymeds.org.          This list is accurate as of: 8/17/17  5:42 PM.  Always use your most recent med list.                   Brand Name Dispense Instructions for use Diagnosis    buPROPion 100 MG 12 hr tablet    WELLBUTRIN SR    360 tablet    Take 2 tablets (200 mg) by mouth 2 times daily For depression    Depression with anxiety       clonazePAM 1 MG tablet    klonoPIN    38 tablet    Take 1 tablet (1 mg) by mouth 2 times daily as needed for anxiety Max#9/week    Other  insomnia, Major depressive disorder, recurrent, severe without psychotic features (H)       HYDROcodone-acetaminophen 5-325 MG per tablet    NORCO    12 tablet    Take 1 tablet by mouth every 6 hours as needed for moderate to severe pain or pain maximum 4 tablet(s) per day    Acute right-sided low back pain without sciatica

## 2017-10-17 ENCOUNTER — ALLIED HEALTH/NURSE VISIT (OUTPATIENT)
Dept: NURSING | Facility: CLINIC | Age: 52
End: 2017-10-17
Payer: COMMERCIAL

## 2017-10-17 DIAGNOSIS — Z02.0 ENCOUNTER FOR SCHOOL EXAMINATION: Primary | ICD-10-CM

## 2017-10-17 DIAGNOSIS — Z23 NEED FOR PROPHYLACTIC VACCINATION AND INOCULATION AGAINST INFLUENZA: ICD-10-CM

## 2017-10-17 DIAGNOSIS — Z11.1 SCREENING EXAMINATION FOR PULMONARY TUBERCULOSIS: ICD-10-CM

## 2017-10-17 PROCEDURE — 36415 COLL VENOUS BLD VENIPUNCTURE: CPT | Performed by: FAMILY MEDICINE

## 2017-10-17 PROCEDURE — 90471 IMMUNIZATION ADMIN: CPT

## 2017-10-17 PROCEDURE — 90686 IIV4 VACC NO PRSV 0.5 ML IM: CPT

## 2017-10-17 PROCEDURE — 86580 TB INTRADERMAL TEST: CPT

## 2017-10-17 PROCEDURE — 86735 MUMPS ANTIBODY: CPT | Performed by: FAMILY MEDICINE

## 2017-10-17 PROCEDURE — 99207 ZZC NO CHARGE NURSE ONLY: CPT

## 2017-10-17 NOTE — MR AVS SNAPSHOT
After Visit Summary   10/17/2017    Mag Salcedo    MRN: [id number]           Patient Information     Date Of Birth          1965        Visit Information        Provider Department      10/17/2017 2:30 PM AN ANCILLARY [facility]        Today's Diagnoses     Encounter for school examination    -  1    Need for prophylactic vaccination and inoculation against influenza        Screening examination for pulmonary tuberculosis           Follow-ups after your visit        Your next 10 appointments already scheduled     Oct 20, 2017 11:00 AM CDT   Nurse Only with An Rn   [facility]    [address] [facility] [address] [phone]            Nov 03, 2017  2:20 PM CDT   SHORT with Pedro Vela MD   [facility]    [address] [facility] [address] [phone]              Who to contact     If you have questions or need follow up information about today's clinic visit or your schedule please contact [facility] directly at [phone].  Normal or non-critical lab and imaging results will be communicated to you by PayPayhart, letter or phone within 4 business days after the clinic has received the results. If you do not hear from us within 7 days, please contact the clinic through [employer] or phone. If you have a critical or abnormal lab result, we will notify you by phone as soon as possible.  Submit refill requests through StrikeIron or call your pharmacy and they will forward the refill request to us. Please allow 3 business days for your refill to be completed.          Additional Information About Your Visit        PayPayhart Information     StrikeIron gives you secure access to your electronic health record. If you see a primary care provider, you can also send messages to your care team and make appointments. If you have questions, please call your primary care clinic.   If you do not have a primary care provider, please call [phone] and they will assist you.        Care EveryWhere ID     This is your Care EveryWhere ID. This could be used by other organizations to access your [place] medical records  [id number]        Your Vitals Were     Last Period                   11/05/2010            Blood Pressure from Last 3 Encounters:   08/17/17 129/87   05/23/17 113/81   03/28/17 113/78    Weight from Last 3 Encounters:   08/17/17 169 lb (76.7 kg)   05/23/17 170 lb (77.1 kg)   03/28/17 166 lb (75.3 kg)              We Performed the Following     FLU VAC, SPLIT VIRUS IM > 3 YO (QUADRIVALENT) [56389]     Mumps Antibody IgG     TB INTRADERMAL TEST     Vaccine Administration, Initial [77532]        Primary Care Provider Office Phone # Fax #    Pedro Dilshad Vela [phone] [address] [facility] [address]        Equal Access to Services     SIOBHAN ESPINOZA : Hadii aad ku hadasho Soomaali, waaxda luqadaha, qaybta kaalmada adeegyada, waxay idiin hayaan adeeg nestoraraanca chavarria . So [facility] [phone].    ATENCIÓN: Si habla español, tiene a mendoza disposición servicios gratuitos de asistencia lingüística. Llame al [phone].    We comply with applicable federal civil rights laws and [place] laws. We do not discriminate on the basis of race, color, national origin, age, disability, sex, sexual orientation, or gender identity.            Thank you!     Thank you for choosing [facility]  for your care. Our goal is always to provide you with excellent care. Hearing back from our patients is one way we can continue to improve our services. Please take a few minutes to complete the written survey that you may receive in the mail after your visit with us. Thank you!             Your Updated Medication List - Protect others around you: Learn how to safely use, store and throw away your medicines at www.disposemymeds.org.          This list is accurate as of:  10/17/17  2:55 PM.  Always use your most recent med list.                   Brand Name Dispense Instructions for use Diagnosis    buPROPion 100 MG 12 hr tablet    WELLBUTRIN SR    360 tablet    Take 2 tablets (200 mg) by mouth 2 times daily For depression    Depression with anxiety       clonazePAM 1 MG tablet    klonoPIN    38 tablet    Take 1 tablet (1 mg) by mouth 2 times daily as needed for anxiety Max#9/week    Other insomnia, Major depressive disorder, recurrent, severe without psychotic features (H)       methylPREDNISolone 4 MG tablet    MEDROL DOSEPAK    21 tablet    Follow package instructions    Acute right-sided low back pain without sciatica

## 2017-10-17 NOTE — PROGRESS NOTES
Injectable Influenza Immunization Documentation    1.  Is the person to be vaccinated sick today?   No    2. Does the person to be vaccinated have an allergy to a component   of the vaccine?   No    3. Has the person to be vaccinated ever had a serious reaction   to influenza vaccine in the past?   No    4. Has the person to be vaccinated ever had Guillain-Barré syndrome?   No    Form completed by Violette Hinojosa MA

## 2017-10-17 NOTE — NURSING NOTE
The patient is asked the following questions today and these are her answers:    -Have you had a mantoux administered in the past 30 days?    No  -Have you had a previous positive Mantoux.  No  -Have you received BCG in the past.  No  -Have you had a live vaccine  (MMR, Varicella, OPV, Yellow Fever) in the last 6 weeks.  No  -Have you had and active  viral or bacterial infection in the past 6 weeks.  No  -Have you received corticosteroids or immunosuppressive agents in the past 6 weeks.  No  -Have you been diagnosed with HIV?  No  -Do you have a maglinancy?  No Violette Hinojosa MA

## 2017-10-18 LAB — MUV IGG SER QL IA: 5.1 AI (ref 0–0.8)

## 2017-10-20 ENCOUNTER — ALLIED HEALTH/NURSE VISIT (OUTPATIENT)
Dept: NURSING | Facility: CLINIC | Age: 52
End: 2017-10-20
Payer: COMMERCIAL

## 2017-10-20 DIAGNOSIS — Z11.1 SCREENING EXAMINATION FOR PULMONARY TUBERCULOSIS: Primary | ICD-10-CM

## 2017-10-20 LAB
PPDINDURATION: 0 MM (ref 0–5)
PPDREDNESS: 0 MM

## 2017-10-20 PROCEDURE — 99207 ZZC NO CHARGE LOS: CPT

## 2017-10-20 NOTE — PROGRESS NOTES
"Mantoux results: No induration.\",\"No swelling.\",\"No redness.  Patient was given copies of immunizations, mantoux result, vaccination labwork, letter from Dr. Pedro Roman RN    "

## 2017-10-20 NOTE — MR AVS SNAPSHOT
After Visit Summary   10/20/2017    Mag Salcedo    MRN: [id number]           Patient Information     Date Of Birth          1965        Visit Information        Provider Department      10/20/2017 11:00 AM Prema, An [facility]        Today's Diagnoses     Screening examination for pulmonary tuberculosis    -  1       Follow-ups after your visit        Your next 10 appointments already scheduled     Nov 03, 2017  2:20 PM CDT   SHORT with Pedro Vela MD   [facility] ([facility])    [address] [facility] [address] [phone]              Who to contact     If you have questions or need follow up information about today's clinic visit or your schedule please contact [facility] directly at [phone].  Normal or non-critical lab and imaging results will be communicated to you by MyChart, letter or phone within 4 business days after the clinic has received the results. If you do not hear from us within 7 days, please contact the clinic through MyChart or phone. If you have a critical or abnormal lab result, we will notify you by phone as soon as possible.  Submit refill requests through Moneythink or call your pharmacy and they will forward the refill request to us. Please allow 3 business days for your refill to be completed.          Additional Information About Your Visit        MyChart Information     Moneythink gives you secure access to your electronic health record. If you see a primary care provider, you can also send messages to your care team and make appointments. If you have questions, please call your primary care clinic.  If you do not have a primary care provider, please call [phone] and they will assist you.        Care EveryWhere ID     This is your Care EveryWhere ID. This could be used by other organizations to access your [place] medical records  [id number]        Your Vitals Were     Last Period                    11/05/2010            Blood Pressure from Last 3 Encounters:   08/17/17 129/87   05/23/17 113/81   03/28/17 113/78    Weight from Last 3 Encounters:   08/17/17 169 lb (76.7 kg)   05/23/17 170 lb (77.1 kg)   03/28/17 166 lb (75.3 kg)              Today, you had the following     No orders found for display       Primary Care Provider Office Phone # Fax #    Pedro Vela [phone] [address] [facility] [address]        Equal Access to Services     [facility]: Hadii aad ku hadasho Soomaali, waaxda luqadaha, qaybta kaalmada adeevayaoscar, margo chavarria . So [facility] [phone].    ATENCIÓN: Si habla español, tiene a mendoza disposición servicios gratuitos de asistencia lingüística. [facility] [phone].    We comply with applicable federal civil rights laws and [place] laws. We do not discriminate on the basis of race, color, national origin, age, disability, sex, sexual orientation, or gender identity.            Thank you!     Thank you for choosing [facility]  for your care. Our goal is always to provide you with excellent care. Hearing back from our patients is one way we can continue to improve our services. Please take a few minutes to complete the written survey that you may receive in the mail after your visit with us. Thank you!             Your Updated Medication List - Protect others around you: Learn how to safely use, store and throw away your medicines at www.disposemymeds.org.          This list is accurate as of: 10/20/17 11:10 AM.  Always use your most recent med list.                   Brand Name Dispense Instructions for use Diagnosis    buPROPion 100 MG 12 hr tablet    WELLBUTRIN SR    360 tablet    Take 2 tablets (200 mg) by mouth 2 times daily For depression    Depression with anxiety       clonazePAM 1 MG tablet    klonoPIN    38 tablet    Take 1 tablet (1 mg) by mouth 2 times daily as needed for anxiety  Max#9/week    Other insomnia, Major depressive disorder, recurrent, severe without psychotic features (H)       methylPREDNISolone 4 MG tablet    MEDROL DOSEPAK    21 tablet    Follow package instructions    Acute right-sided low back pain without sciatica

## 2017-10-20 NOTE — LETTER
[facility]                                             [address] [place]  [address]    October 19, 2017    Mag WALDO Denisse  [address]   [place] [address]    To Whom It May Concern:    Mag Salcedo has immunity to measles, mumps, rubella, hepatitis B and varicella.    Please contact us if questions or concerns        Pedro Vela MD

## 2017-11-07 ENCOUNTER — OFFICE VISIT (OUTPATIENT)
Dept: FAMILY MEDICINE | Facility: CLINIC | Age: 52
End: 2017-11-07

## 2017-11-07 VITALS
TEMPERATURE: 99.3 F | OXYGEN SATURATION: 95 % | WEIGHT: 167 LBS | HEIGHT: 61 IN | HEART RATE: 103 BPM | BODY MASS INDEX: 31.53 KG/M2 | DIASTOLIC BLOOD PRESSURE: 75 MMHG | SYSTOLIC BLOOD PRESSURE: 106 MMHG

## 2017-11-07 DIAGNOSIS — G47.09 OTHER INSOMNIA: ICD-10-CM

## 2017-11-07 DIAGNOSIS — Z12.31 ENCOUNTER FOR SCREENING MAMMOGRAM FOR BREAST CANCER: ICD-10-CM

## 2017-11-07 DIAGNOSIS — F33.2 MAJOR DEPRESSIVE DISORDER, RECURRENT, SEVERE WITHOUT PSYCHOTIC FEATURES (H): Primary | ICD-10-CM

## 2017-11-07 PROCEDURE — 99214 OFFICE O/P EST MOD 30 MIN: CPT | Performed by: FAMILY MEDICINE

## 2017-11-07 RX ORDER — CLONAZEPAM 1 MG/1
1 TABLET ORAL 2 TIMES DAILY PRN
Qty: 38 TABLET | Refills: 5 | Status: SHIPPED | OUTPATIENT
Start: 2017-11-07 | End: 2018-05-31

## 2017-11-07 RX ORDER — BUPROPION HYDROCHLORIDE 100 MG/1
200 TABLET, EXTENDED RELEASE ORAL 2 TIMES DAILY
Qty: 360 TABLET | Refills: 1 | Status: SHIPPED | OUTPATIENT
Start: 2017-11-07 | End: 2018-05-31

## 2017-11-07 RX ORDER — EPINEPHRINE 0.3 MG/.3ML
0.3 INJECTION SUBCUTANEOUS PRN
COMMUNITY

## 2017-11-07 ASSESSMENT — ANXIETY QUESTIONNAIRES
IF YOU CHECKED OFF ANY PROBLEMS ON THIS QUESTIONNAIRE, HOW DIFFICULT HAVE THESE PROBLEMS MADE IT FOR YOU TO DO YOUR WORK, TAKE CARE OF THINGS AT HOME, OR GET ALONG WITH OTHER PEOPLE: SOMEWHAT DIFFICULT
2. NOT BEING ABLE TO STOP OR CONTROL WORRYING: NEARLY EVERY DAY
5. BEING SO RESTLESS THAT IT IS HARD TO SIT STILL: SEVERAL DAYS
6. BECOMING EASILY ANNOYED OR IRRITABLE: SEVERAL DAYS
3. WORRYING TOO MUCH ABOUT DIFFERENT THINGS: NEARLY EVERY DAY
1. FEELING NERVOUS, ANXIOUS, OR ON EDGE: SEVERAL DAYS
GAD7 TOTAL SCORE: 12
7. FEELING AFRAID AS IF SOMETHING AWFUL MIGHT HAPPEN: SEVERAL DAYS

## 2017-11-07 ASSESSMENT — PATIENT HEALTH QUESTIONNAIRE - PHQ9
5. POOR APPETITE OR OVEREATING: MORE THAN HALF THE DAYS
SUM OF ALL RESPONSES TO PHQ QUESTIONS 1-9: 11

## 2017-11-07 NOTE — MR AVS SNAPSHOT
"              After Visit Summary   11/7/2017    Mag Salcedo    MRN: [id number]           Patient Information     Date Of Birth          1965        Visit Information        Provider Department      11/7/2017 11:10 AM Pedro Vela MD [facility]        Today's Diagnoses     Major depressive disorder, recurrent, severe without psychotic features (H)    -  1    Other insomnia        Encounter for screening mammogram for breast cancer           Follow-ups after your visit        Who to contact     If you have questions or need follow up information about today's clinic visit or your schedule please contact [facility] directly at [phone].  Normal or non-critical lab and imaging results will be communicated to you by MyChart, letter or phone within 4 business days after the clinic has received the results. If you do not hear from us within 7 days, please contact the clinic through [employer] or phone. If you have a critical or abnormal lab result, we will notify you by phone as soon as possible.  Submit refill requests through [employer] or call your pharmacy and they will forward the refill request to us. Please allow 3 business days for your refill to be completed.          Additional Information About Your Visit        MyChart Information     [employer] gives you secure access to your electronic health record. If you see a primary care provider, you can also send messages to your care team and make appointments. If you have questions, please call your primary care clinic.  If you do not have a primary care provider, please call [phone] and they will assist you.        Care EveryWhere ID     This is your Care EveryWhere ID. This could be used by other organizations to access your [place] medical records  [id number]        Your Vitals Were     Pulse Temperature Height Last Period Pulse Oximetry BMI (Body Mass Index)    103 99.3  F (37.4  C) (Oral) 5' 1\" (1.549 m) 11/05/2010 " 95% 31.55 kg/m2       Blood Pressure from Last 3 Encounters:   11/07/17 106/75   08/17/17 129/87   05/23/17 113/81    Weight from Last 3 Encounters:   11/07/17 167 lb (75.8 kg)   08/17/17 169 lb (76.7 kg)   05/23/17 170 lb (77.1 kg)              Today, you had the following     No orders found for display         Where to get your medicines      These medications were sent to [id number] - [place], [place] - [address]  [address], [place] [address]     Phone:  [phone]     buPROPion 100 MG 12 hr tablet         Some of these will need a paper prescription and others can be bought over the counter.  Ask your nurse if you have questions.     Bring a paper prescription for each of these medications     clonazePAM 1 MG tablet          Primary Care Provider Office Phone # Fax #    Pedro Vela -[phone] [address] [facility] [address]        Equal Access to Services     [facility]: Hadii aad ku hadasho Soomaali, waaxda luqadaha, qaybta kaalmada adeegyada, waxay idiin hayaan cassi kharash aura . So [facility] [phone].    ATENCIÓN: Si habla español, tiene a mendoza disposición servicios gratuitos de asistencia lingüística. [facility] [phone].    We comply with applicable federal civil rights laws and [place] laws. We do not discriminate on the basis of race, color, national origin, age, disability, sex, sexual orientation, or gender identity.            Thank you!     Thank you for choosing [facility]  for your care. Our goal is always to provide you with excellent care. Hearing back from our patients is one way we can continue to improve our services. Please take a few minutes to complete the written survey that you may receive in the mail after your visit with us. Thank you!             Your Updated Medication List - Protect others around you: Learn how to safely use, store and throw away your medicines at www.disposemymeds.org.          This list is accurate  as of: 11/7/17 12:33 PM.  Always use your most recent med list.                   Brand Name Dispense Instructions for use Diagnosis    ADRENACLICK 0.3 MG/0.3ML injection 2-pack   Generic drug:  EPINEPHrine      Inject 0.3 mg into the muscle as needed for anaphylaxis        buPROPion 100 MG 12 hr tablet    WELLBUTRIN SR    360 tablet    Take 2 tablets (200 mg) by mouth 2 times daily For depression    Major depressive disorder, recurrent, severe without psychotic features (H)       clonazePAM 1 MG tablet    klonoPIN    38 tablet    Take 1 tablet (1 mg) by mouth 2 times daily as needed for anxiety Max#9/week    Other insomnia, Major depressive disorder, recurrent, severe without psychotic features (H)       methylPREDNISolone 4 MG tablet    MEDROL DOSEPAK    21 tablet    Follow package instructions    Acute right-sided low back pain without sciatica

## 2017-11-07 NOTE — NURSING NOTE
"Chief Complaint   Patient presents with     Depression       Initial /75  Pulse 103  Temp 99.3  F (37.4  C) (Oral)  Ht 5' 1\" (1.549 m)  Wt 167 lb (75.8 kg)  LMP 11/05/2010  SpO2 95%  BMI 31.55 kg/m2 Estimated body mass index is 31.55 kg/(m^2) as calculated from the following:    Height as of this encounter: 5' 1\" (1.549 m).    Weight as of this encounter: 167 lb (75.8 kg).  Medication Reconciliation: complete   Olya Winkler CMA    "

## 2017-11-07 NOTE — PROGRESS NOTES
"SUBJECTIVE:  Mag Salcedo, a 51 year old female scheduled an appointment to discuss the following issues:  Follow-up depression/anxiety and insomnia.  Needs mammogram. Not interested in colonoscopy.   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 School going ok.   Taking klonopin about #9/week. Sleep overall ok.   2 cups coffee in AM. Lots of water. No ALCOHOL. Exercise - difficult with school/work.   No breast changes or lumps. Family history breast cancer.   Medical, social, surgical, and family histories reviewed.    ROS:  All other ROS negative  OBJECTIVE:  LMP 11/05/2010   /75  Pulse 103  Temp 99.3  F (37.4  C) (Oral)  Ht 5' 1\" (1.549 m)  Wt 167 lb (75.8 kg)  LMP 11/05/2010  SpO2 95%  BMI 31.55 kg/m2    EXAM:  GENERAL APPEARANCE: healthy, alert and no distress  NECK: no adenopathy, no asymmetry, masses, or scars and thyroid normal to palpation  RESP: lungs clear to auscultation - no rales, rhonchi or wheezes  CV: regular rates and rhythm, normal S1 S2, no S3 or S4 and no murmur, click or rub -  ABDOMEN:  soft, nontender, no HSM or masses and bowel sounds normal  MS: extremities normal- no gross deformities noted, no evidence of inflammation in joints, FROM in all extremities.  PSYCH: mentation appears normal and affect normal/bright  PSYCH: mildly anxious    ASSESSMENT / PLAN:  (F33.2) Major depressive disorder, recurrent, severe without psychotic features (H)  (primary encounter diagnosis)  Comment: stable. No SUICIAL PLANS. Stress with work/school but supportive family  Plan: buPROPion (WELLBUTRIN SR) 100 MG 12 hr tablet,         clonazePAM (KLONOPIN) 1 MG tablet        Continue meds. Reveiwed risks and side effects of medication  If SUICIAL IDEATION OR HOMOCIDAL IDEATION OR PATRICK TO ER. Recheck in 6 months  Sooner if worse. Exercise more and limit caffeine/ avoid ALCOHOL. Consider therapist and psych too. Call/email with questions/concerns    (G47.09) Other insomnia  Comment: stable  Plan: clonazePAM " (KLONOPIN) 1 MG tablet        Exercise.     (Z12.31) Encounter for screening mammogram for breast cancer  Comment: family history breast cancer  Plan: discussed importance of a YEARLY mammogram. Continue self-breast exams.     Pedro Vela MD

## 2017-11-08 ASSESSMENT — ANXIETY QUESTIONNAIRES: GAD7 TOTAL SCORE: 12

## 2017-11-30 DIAGNOSIS — G47.09 OTHER INSOMNIA: ICD-10-CM

## 2017-11-30 DIAGNOSIS — F33.2 MAJOR DEPRESSIVE DISORDER, RECURRENT, SEVERE WITHOUT PSYCHOTIC FEATURES (H): ICD-10-CM

## 2017-11-30 RX ORDER — CLONAZEPAM 1 MG/1
1 TABLET ORAL 2 TIMES DAILY PRN
OUTPATIENT
Start: 2017-11-30

## 2017-11-30 NOTE — TELEPHONE ENCOUNTER
Request for klonopin refill.  Refused refill with note to pharmacy that it was refilled 11/7/17 with 5 refills.    Gillian Valenzuela RN

## 2018-05-03 ENCOUNTER — TELEPHONE (OUTPATIENT)
Dept: FAMILY MEDICINE | Facility: CLINIC | Age: 53
End: 2018-05-03

## 2018-05-03 NOTE — TELEPHONE ENCOUNTER
PHQ-9 due now for patient ( 5-7 months from index date)  Index date:       11/7/17  Index PHQ9 :   11  FU start date:   4/7/18  FU End date :   6/7/18    RN- Contact patient for PHQ-9. Remission considered if follow up PHQ-9 less than 5.  If greater than 5 consider follow up appointment, e-visit for medication follow up and evaluation.

## 2018-05-07 ENCOUNTER — HEALTH MAINTENANCE LETTER (OUTPATIENT)
Age: 53
End: 2018-05-07

## 2018-05-16 NOTE — TELEPHONE ENCOUNTER
Left message on answering machine for patient to call back to [phone] or respond to [employer] message.  Olya Vera RN

## 2018-05-23 NOTE — TELEPHONE ENCOUNTER
Left message on answering machine for patient to call back to [phone].  Olya Vera BSN, RN

## 2018-05-24 NOTE — TELEPHONE ENCOUNTER
Pt completed PHQ-9 with score of 9 this is improved score.  Pt has appointment scheduled for next week.  To provider as FYI.  Olya Vera RN

## 2018-05-31 ENCOUNTER — OFFICE VISIT (OUTPATIENT)
Dept: FAMILY MEDICINE | Facility: CLINIC | Age: 53
End: 2018-05-31

## 2018-05-31 VITALS
TEMPERATURE: 98.3 F | HEART RATE: 98 BPM | OXYGEN SATURATION: 98 % | SYSTOLIC BLOOD PRESSURE: 130 MMHG | BODY MASS INDEX: 30.02 KG/M2 | WEIGHT: 159 LBS | RESPIRATION RATE: 20 BRPM | DIASTOLIC BLOOD PRESSURE: 88 MMHG | HEIGHT: 61 IN

## 2018-05-31 DIAGNOSIS — G47.09 OTHER INSOMNIA: ICD-10-CM

## 2018-05-31 DIAGNOSIS — F33.2 MAJOR DEPRESSIVE DISORDER, RECURRENT, SEVERE WITHOUT PSYCHOTIC FEATURES (H): ICD-10-CM

## 2018-05-31 PROCEDURE — 99213 OFFICE O/P EST LOW 20 MIN: CPT | Performed by: FAMILY MEDICINE

## 2018-05-31 RX ORDER — BUPROPION HYDROCHLORIDE 100 MG/1
200 TABLET, EXTENDED RELEASE ORAL 2 TIMES DAILY
Qty: 360 TABLET | Refills: 1 | Status: SHIPPED | OUTPATIENT
Start: 2018-05-31 | End: 2019-02-08

## 2018-05-31 RX ORDER — CLONAZEPAM 1 MG/1
1 TABLET ORAL 2 TIMES DAILY PRN
Qty: 38 TABLET | Refills: 5 | Status: SHIPPED | OUTPATIENT
Start: 2018-05-31 | End: 2019-01-11

## 2018-05-31 NOTE — LETTER
My Depression Action Plan  Name: Mag Salcedo   Date of Birth 1965  Date: 5/24/2018    My doctor: Pedro Vela   My clinic: [facility]  [id number] [place] [address] [phone]          GREEN    ZONE   Good Control    What it looks like:     Things are going generally well. You have normal up s and down s. You may even feel depressed from time to time, but bad moods usually last less than a day.   What you need to do:  1. Continue to care for yourself (see self care plan)  2. Check your depression survival kit and update it as needed  3. Follow your physician s recommendations including any medication.  4. Do not stop taking medication unless you consult with your physician first.           YELLOW         ZONE Getting Worse    What it looks like:     Depression is starting to interfere with your life.     It may be hard to get out of bed; you may be starting to isolate yourself from others.    Symptoms of depression are starting to last most all day and this has happened for several days.     You may have suicidal thoughts but they are not constant.   What you need to do:     1. Call your care team, your response to treatment will improve if you keep your care team informed of your progress. Yellow periods are signs an adjustment may need to be made.     2. Continue your self-care, even if you have to fake it!    3. Talk to someone in your support network    4. Open up your depression survival kit           RED    ZONE Medical Alert - Get Help    What it looks like:     Depression is seriously interfering with your life.     You may experience these or other symptoms: You can t get out of bed most days, can t work or engage in other necessary activities, you have trouble taking care of basic hygiene, or basic responsibilities, thoughts of suicide or death that will not go away, self-injurious behavior.     What you need to do:  1. Call your care team and  request a same-day appointment. If they are not available (weekends or after hours) call your local crisis line, emergency room or 911.            Depression Self Care Plan / Survival Kit    Self-Care for Depression  Here s the deal. Your body and mind are really not as separate as most people think.  What you do and think affects how you feel and how you feel influences what you do and think. This means if you do things that people who feel good do, it will help you feel better.  Sometimes this is all it takes.  There is also a place for medication and therapy depending on how severe your depression is, so be sure to consult with your medical provider and/ or Behavioral Health Consultant if your symptoms are worsening or not improving.     In order to better manage my stress, I will:    Exercise  Get some form of exercise, every day. This will help reduce pain and release endorphins, the  feel good  chemicals in your brain. This is almost as good as taking antidepressants!  This is not the same as joining a gym and then never going! (they count on that by the way ) It can be as simple as just going for a walk or doing some gardening, anything that will get you moving.      Hygiene   Maintain good hygiene (Get out of bed in the morning, Make your bed, Brush your teeth, Take a shower, and Get dressed like you were going to work, even if you are unemployed).  If your clothes don't fit try to get ones that do.    Diet  I will strive to eat foods that are good for me, drink plenty of water, and avoid excessive sugar, caffeine, alcohol, and other mood-altering substances.  Some foods that are helpful in depression are: complex carbohydrates, B vitamins, flaxseed, fish or fish oil, fresh fruits and vegetables.    Psychotherapy  I agree to participate in Individual Therapy (if recommended).    Medication  If prescribed medications, I agree to take them.  Missing doses can result in serious side effects.  I understand that  drinking alcohol, or other illicit drug use, may cause potential side effects.  I will not stop my medication abruptly without first discussing it with my provider.    Staying Connected With Others  I will stay in touch with my friends, family members, and my primary care provider/team.    Use your imagination  Be creative.  We all have a creative side; it doesn t matter if it s oil painting, sand castles, or mud pies! This will also kick up the endorphins.    Witness Beauty  (AKA stop and smell the roses) Take a look outside, even in mid-winter. Notice colors, textures. Watch the squirrels and birds.     Service to others  Be of service to others.  There is always someone else in need.  By helping others we can  get out of ourselves  and remember the really important things.  This also provides opportunities for practicing all the other parts of the program.    Humor  Laugh and be silly!  Adjust your TV habits for less news and crime-drama and more comedy.    Control your stress  Try breathing deep, massage therapy, biofeedback, and meditation. Find time to relax each day.     My support system    Clinic Contact:  Phone number:    Contact 1:  Phone number:    Contact 2:  Phone number:    Mormonism/:  Phone number:    Therapist:  Phone number:    Local crisis center:    Phone number:    Other community support:  Phone number:

## 2018-05-31 NOTE — NURSING NOTE
"Chief Complaint   Patient presents with     Depression     Health Maintenance     dap done/ schedule afe        Initial /88  Pulse 98  Temp 98.3  F (36.8  C) (Oral)  Resp 20  Ht 5' 1\" (1.549 m)  Wt 159 lb (72.1 kg)  LMP 11/05/2010  SpO2 98%  BMI 30.04 kg/m2 Estimated body mass index is 30.04 kg/(m^2) as calculated from the following:    Height as of this encounter: 5' 1\" (1.549 m).    Weight as of this encounter: 159 lb (72.1 kg).    Olya Winkler CMA    "

## 2018-05-31 NOTE — PROGRESS NOTES
"SUBJECTIVE:  Mag Salcedo, a 52 year old female scheduled an appointment to discuss the following issues:  Follow-up depression. No insurance currently. 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   Home life ok. Dating x3 months - going ok.   No SUICIAL IDEATION OR HOMOCIDAL IDEATION OR PATRICK.   Exercise regulary. Limited caffeine. ALCOHOL - rarely.   Work - going ok.     Medical, social, surgical, and family histories reviewed.    ROS:  All other ROS negative    OBJECTIVE:  /88  Pulse 98  Temp 98.3  F (36.8  C) (Oral)  Resp 20  Ht 5' 1\" (1.549 m)  Wt 159 lb (72.1 kg)  LMP 11/05/2010  SpO2 98%  BMI 30.04 kg/m2  EXAM:  GENERAL APPEARANCE: healthy, alert and no distress  NECK: no adenopathy, no asymmetry, masses, or scars and thyroid normal to palpation  RESP: lungs clear to auscultation - no rales, rhonchi or wheezes  CV: regular rates and rhythm, normal S1 S2, no S3 or S4 and no murmur, click or rub -  ABDOMEN:  soft, nontender, no HSM or masses and bowel sounds normal  MS: extremities normal- no gross deformities noted, no evidence of inflammation in joints, FROM in all extremities.  PSYCH: mentation appears normal and affect normal/bright    ASSESSMENT / PLAN:  (F33.2) Major depressive disorder, recurrent, severe without psychotic features (H)  Comment: stable. Family and dating going well.   Plan: buPROPion (WELLBUTRIN SR) 100 MG 12 hr tablet,         clonazePAM (KLONOPIN) 1 MG tablet        Reveiwed risks and side effects of medication  Recheck in 6 months  Sooner if worse. Exercise. Call/email with questions/concerns. If SUICIAL IDEATION OR HOMOCIDAL IDEATION OR PATRICK TO ER. Continue avoid ALCOHOL.    (G47.09) Other insomnia  Comment: stable  Plan: clonazePAM (KLONOPIN) 1 MG tablet        Prn sleep. Reveiwed risks and side effects of medication    Advised follow-up mammogram/fit card/pap in fall or sooner with insurance.     Pedro Vela MD        "

## 2018-05-31 NOTE — MR AVS SNAPSHOT
"              After Visit Summary   5/31/2018    Mag Salcedo    MRN: [id number]           Patient Information     Date Of Birth          1965        Visit Information        Provider Department      5/31/2018 11:40 AM Pedro Vela MD [facility]        Today's Diagnoses     Major depressive disorder, recurrent, severe without psychotic features (H)        Other insomnia           Follow-ups after your visit        Who to contact     If you have questions or need follow up information about today's clinic visit or your schedule please contact [facility] directly at [phone].  Normal or non-critical lab and imaging results will be communicated to you by [employer]art, letter or phone within 4 business days after the clinic has received the results. If you do not hear from us within 7 days, please contact the clinic through [employer] or phone. If you have a critical or abnormal lab result, we will notify you by phone as soon as possible.  Submit refill requests through [employer] or call your pharmacy and they will forward the refill request to us. Please allow 3 business days for your refill to be completed.          Additional Information About Your Visit        MyChart Information     [employer] gives you secure access to your electronic health record. If you see a primary care provider, you can also send messages to your care team and make appointments. If you have questions, please call your primary care clinic.  If you do not have a primary care provider, please call [phone] and they will assist you.        Care EveryWhere ID     This is your Care EveryWhere ID. This could be used by other organizations to access your [facility] medical records  [id number]        Your Vitals Were     Pulse Temperature Respirations Height Last Period Pulse Oximetry    98 98.3  F (36.8  C) (Oral) 20 5' 1\" (1.549 m) 11/05/2010 98%    BMI (Body Mass Index)                   30.04 kg/m2            " Blood Pressure from Last 3 Encounters:   05/31/18 130/88   11/07/17 106/75   08/17/17 129/87    Weight from Last 3 Encounters:   05/31/18 159 lb (72.1 kg)   11/07/17 167 lb (75.8 kg)   08/17/17 169 lb (76.7 kg)              Today, you had the following     No orders found for display         Where to get your medicines      These medications were sent to [facility] [id number] [place] [address] [address] [facility] [address]  [address] [facility] [address]     Phone:  [phone]     buPROPion 100 MG 12 hr tablet         Some of these will need a paper prescription and others can be bought over the counter.  Ask your nurse if you have questions.     Bring a paper prescription for each of these medications     clonazePAM 1 MG tablet          Primary Care Provider Office Phone # Fax #    Pedro Vela [phone] [address] [facility] [address]        Equal Access to Services     PAULA ESPINOZA : Hadii aad ku hadasho Soomaali, waaxda luqadaha, qaybta kaalmada adeegyada, waxay idiin hayava chavarria . So [facility] [place] [phone].    ATENCIÓN: Si habla español, tiene a mendoza disposición servicios gratuitos de asistencia lingüística. [facility] [phone].    We comply with applicable federal civil rights laws and [place] laws. We do not discriminate on the basis of race, color, national origin, age, disability, sex, sexual orientation, or gender identity.            Thank you!     Thank you for choosing [facility]  for your care. Our goal is always to provide you with excellent care. Hearing back from our patients is one way we can continue to improve our services. Please take a few minutes to complete the written survey that you may receive in the mail after your visit with us. Thank you!             Your Updated Medication List - Protect others around you: Learn how to safely use, store and throw away your medicines at  www.disposemymeds.org.          This list is accurate as of 5/31/18  1:54 PM.  Always use your most recent med list.                   Brand Name Dispense Instructions for use Diagnosis    ADRENACLICK 0.3 MG/0.3ML injection 2-pack   Generic drug:  EPINEPHrine      Inject 0.3 mg into the muscle as needed for anaphylaxis        buPROPion 100 MG 12 hr tablet    WELLBUTRIN SR    360 tablet    Take 2 tablets (200 mg) by mouth 2 times daily For depression    Major depressive disorder, recurrent, severe without psychotic features (H)       clonazePAM 1 MG tablet    klonoPIN    38 tablet    Take 1 tablet (1 mg) by mouth 2 times daily as needed for anxiety Max#9/week    Other insomnia, Major depressive disorder, recurrent, severe without psychotic features (H)

## 2018-08-28 ENCOUNTER — TELEPHONE (OUTPATIENT)
Dept: FAMILY MEDICINE | Facility: CLINIC | Age: 53
End: 2018-08-28

## 2018-08-28 NOTE — TELEPHONE ENCOUNTER
8/28/2018    Attempt 1    Contacted patient in regards to scheduling VIP mammogram  Message on voicemail     Patient is also due for - Preventive Health Screening Colonoscopy    Comments:       Outreach   Heike Bennett

## 2018-09-17 NOTE — TELEPHONE ENCOUNTER
9/17/2018    Call Regarding VIP Mammogram    Attempt 2    Message on voicemail    Patient is also due for: Preventive Health Screening Colonoscopy    Outreach   SV

## 2018-09-25 NOTE — TELEPHONE ENCOUNTER
9/25/2018    Attempt 3    Contacted patient in regards to scheduling VIP mammogram  Message on voicemail     Patient is also due for - Preventive Health Screening Colonoscopy    Comments:       Outreach   Heike Bennett

## 2019-01-11 DIAGNOSIS — F33.2 MAJOR DEPRESSIVE DISORDER, RECURRENT, SEVERE WITHOUT PSYCHOTIC FEATURES (H): ICD-10-CM

## 2019-01-11 DIAGNOSIS — G47.09 OTHER INSOMNIA: ICD-10-CM

## 2019-01-11 RX ORDER — CLONAZEPAM 1 MG/1
1 TABLET ORAL 2 TIMES DAILY PRN
Qty: 38 TABLET | Refills: 0 | Status: SHIPPED | OUTPATIENT
Start: 2019-01-11 | End: 2019-02-08

## 2019-01-11 NOTE — TELEPHONE ENCOUNTER
Faxed RX to [facility] in [place]. Left message on patient's voicemail that this was done.Darlene Sanchez MA/MAICOL

## 2019-01-11 NOTE — TELEPHONE ENCOUNTER
Patient was scheduled today 01/11/19 for depression thong with Dr Vela, patient needs a partial refill of Clonopin she is completely out. Pharmacy [facility] in [place]. Please call to let her know if this can be done.  MAICOL Ford

## 2019-01-11 NOTE — TELEPHONE ENCOUNTER
Controlled Substance Refill Request for clonazepam  Problem List Complete:  No     PROVIDER TO CONSIDER COMPLETION OF PROBLEM LIST AND OVERVIEW/CONTROLLED SUBSTANCE AGREEMENT    Last Written Prescription Date:  5/31/18  Last Fill Quantity: 38,   # refills: 5    THE MOST RECENT OFFICE VISIT MUST BE WITHIN THE PAST 3 MONTHS. (AT LEAST ONE FACE TO FACE VISIT MUST OCCUR EVERY 6 MONTHS. ADDITIONAL VISITS CAN BE VIRTUAL.)    Last Office Visit with [facility] primary care provider: 5/31/18    Future Office visit:   Next 5 appointments (look out 90 days)    Nicolás 15, 2019 11:45 AM CST  SHORT [facility] [address] [facility] [address] [phone]          Controlled substance agreement: no    Last Urine Drug Screen: No results found for: CDAUT, No results found for: COMDAT, No results found for: THC13, PCP13, COC13, MAMP13, OPI13, AMP13, BZO13, TCA13, MTD13, BAR13, OXY13, PPX13, BUP13     Processing:  Fax Rx to [facility] pharmacy     [email or web address]       checked in past 3 months?  Yes 1/11/19   Olya WHEAT, RN

## 2019-02-08 ENCOUNTER — OFFICE VISIT (OUTPATIENT)
Dept: FAMILY MEDICINE | Facility: CLINIC | Age: 54
End: 2019-02-08

## 2019-02-08 VITALS
WEIGHT: 151 LBS | SYSTOLIC BLOOD PRESSURE: 121 MMHG | DIASTOLIC BLOOD PRESSURE: 77 MMHG | HEIGHT: 61 IN | HEART RATE: 106 BPM | RESPIRATION RATE: 16 BRPM | BODY MASS INDEX: 28.51 KG/M2 | OXYGEN SATURATION: 96 % | TEMPERATURE: 98.2 F

## 2019-02-08 DIAGNOSIS — G47.09 OTHER INSOMNIA: ICD-10-CM

## 2019-02-08 DIAGNOSIS — G43.719 INTRACTABLE CHRONIC MIGRAINE WITHOUT AURA AND WITHOUT STATUS MIGRAINOSUS: Primary | ICD-10-CM

## 2019-02-08 DIAGNOSIS — F33.2 MAJOR DEPRESSIVE DISORDER, RECURRENT, SEVERE WITHOUT PSYCHOTIC FEATURES (H): ICD-10-CM

## 2019-02-08 PROCEDURE — 99213 OFFICE O/P EST LOW 20 MIN: CPT | Performed by: FAMILY MEDICINE

## 2019-02-08 RX ORDER — CLONAZEPAM 1 MG/1
1 TABLET ORAL 2 TIMES DAILY PRN
Qty: 48 TABLET | Refills: 5 | Status: SHIPPED | OUTPATIENT
Start: 2019-02-08 | End: 2019-08-26

## 2019-02-08 RX ORDER — BUPROPION HYDROCHLORIDE 100 MG/1
200 TABLET, EXTENDED RELEASE ORAL 2 TIMES DAILY
Qty: 360 TABLET | Refills: 1 | Status: SHIPPED | OUTPATIENT
Start: 2019-02-08 | End: 2019-08-26

## 2019-02-08 RX ORDER — BUTALBITAL, ACETAMINOPHEN AND CAFFEINE 50; 325; 40 MG/1; MG/1; MG/1
1 TABLET ORAL EVERY 6 HOURS PRN
Qty: 30 TABLET | Refills: 1 | Status: SHIPPED | OUTPATIENT
Start: 2019-02-08 | End: 2019-08-26

## 2019-02-08 ASSESSMENT — ANXIETY QUESTIONNAIRES
1. FEELING NERVOUS, ANXIOUS, OR ON EDGE: SEVERAL DAYS
GAD7 TOTAL SCORE: 5
5. BEING SO RESTLESS THAT IT IS HARD TO SIT STILL: SEVERAL DAYS
IF YOU CHECKED OFF ANY PROBLEMS ON THIS QUESTIONNAIRE, HOW DIFFICULT HAVE THESE PROBLEMS MADE IT FOR YOU TO DO YOUR WORK, TAKE CARE OF THINGS AT HOME, OR GET ALONG WITH OTHER PEOPLE: SOMEWHAT DIFFICULT
3. WORRYING TOO MUCH ABOUT DIFFERENT THINGS: SEVERAL DAYS
2. NOT BEING ABLE TO STOP OR CONTROL WORRYING: SEVERAL DAYS
6. BECOMING EASILY ANNOYED OR IRRITABLE: NOT AT ALL
7. FEELING AFRAID AS IF SOMETHING AWFUL MIGHT HAPPEN: NOT AT ALL

## 2019-02-08 ASSESSMENT — MIFFLIN-ST. JEOR: SCORE: 1222.31

## 2019-02-08 ASSESSMENT — PATIENT HEALTH QUESTIONNAIRE - PHQ9
SUM OF ALL RESPONSES TO PHQ QUESTIONS 1-9: 5
5. POOR APPETITE OR OVEREATING: SEVERAL DAYS

## 2019-02-08 NOTE — NURSING NOTE
"Chief Complaint   Patient presents with     Depression     mamm/pap       Initial /77   Pulse 106   Temp 98.2  F (36.8  C) (Oral)   Resp 16   Ht 1.549 m (5' 1\")   Wt 68.5 kg (151 lb)   LMP 11/05/2010   SpO2 96%   BMI 28.53 kg/m   Estimated body mass index is 28.53 kg/m  as calculated from the following:    Height as of this encounter: 1.549 m (5' 1\").    Weight as of this encounter: 68.5 kg (151 lb).    Olya Winkler, SHEREE    "

## 2019-02-08 NOTE — PROGRESS NOTES
"SUBJECTIVE:  Mag Salcedo, a 53 year old female scheduled an appointment to discuss the following issues:  Follow-up anxiety/depression.   Wasp/food allergies.   Prozac/ativan/buspar in past.   Seen psych in past.   Klonopin 1mg at bedtime and 1/2 pill as needed. Would like to increase from 9 to 12/month at least until spring.   1-2 coffee in AM. No ALCOHOL.   New boyfriend. Kid - stress.   Klonopin helpful.   No insurance. No SUICIAL IDEATION OR HOMOCIDAL IDEATION OR PATRICK. Would like to try fiorcet for migraines.   Past Medical History:   Diagnosis Date     History of cervical dysplasia        No past surgical history on file.    Family History   Problem Relation Age of Onset     Coronary Artery Disease Mother         heart attack in sleep     Breast Cancer Sister        Social History     Tobacco Use     Smoking status: Never Smoker     Smokeless tobacco: Never Used   Substance Use Topics     Alcohol use: Yes     Comment: occ       ROS:  All other ROS negative.     OBJECTIVE:  /77   Pulse 106   Temp 98.2  F (36.8  C) (Oral)   Resp 16   Ht 1.549 m (5' 1\")   Wt 68.5 kg (151 lb)   LMP 11/05/2010   SpO2 96%   BMI 28.53 kg/m    EXAM:  GENERAL APPEARANCE: healthy, alert and no distress  RESP: lungs clear to auscultation - no rales, rhonchi or wheezes  CV: regular rates and rhythm, normal S1 S2, no S3 or S4 and no murmur, click or rub -  ABDOMEN:  soft, nontender, no HSM or masses and bowel sounds normal  PSYCH: mentation appears normal and affect normal/bright    ASSESSMENT / PLAN:  (G43.363) Intractable chronic migraine without aura and without status migrainosus  (primary encounter diagnosis)  Comment: briefly discussed  Plan: butalbital-acetaminophen-caffeine         (FIORICET/ESGIC) -40 MG tablet        Neurology if worse. Reveiwed risks and side effects of medication      (F33.2) Major depressive disorder, recurrent, severe without psychotic features (H)  Comment: stable but more " anxiety  Plan: buPROPion (WELLBUTRIN SR) 100 MG 12 hr tablet,         clonazePAM (KLONOPIN) 1 MG tablet        From 9 to 12/week klonopin. Continue avoid ALCOHOL and limit caffeine. Increase exercise. Psych follow-up with insurance. Recheck in 6 months  Sooner if worse. If SUICIAL IDEATION OR HOMOCIDAL IDEATION OR PATRICK TO ER. Call/email with questions/concerns    (G47.09) Other insomnia  Comment: needs help  Plan: clonazePAM (KLONOPIN) 1 MG tablet        As above. Add over the counter melatonin/cbd oil.     Pedro Vela

## 2019-02-08 NOTE — PROGRESS NOTES
Depression check   I gave her a handout for the teresa program for pap and mammogram. She does not have insurance.

## 2019-02-09 ASSESSMENT — ANXIETY QUESTIONNAIRES: GAD7 TOTAL SCORE: 5

## 2019-05-31 ENCOUNTER — TELEPHONE (OUTPATIENT)
Dept: FAMILY MEDICINE | Facility: CLINIC | Age: 54
End: 2019-05-31

## 2019-05-31 NOTE — TELEPHONE ENCOUNTER
Reason for Call:  Form, our goal is to have forms completed with 72 hours, however, some forms may require a visit or additional information.    Type of letter, form or note:   Animal Request Forms    Who is the form from?: Patient    Where did the form come from: form was mailed in    What clinic location was the form placed at?: [facility]    Where the form was placed: Given to physician    What number is listed as a contact on the form?: Mag [phone]       Additional comments: I placed the forms in the providers folder for review    Call taken on 5/31/2019 at 3:24 PM by Kristi Chambers

## 2019-06-03 NOTE — TELEPHONE ENCOUNTER
Ok form done. Need to be seen in next 1-2 month for follow-up med/etc. Sooner if worse. Pedro Vela MD

## 2019-08-05 ENCOUNTER — TELEPHONE (OUTPATIENT)
Dept: FAMILY MEDICINE | Facility: CLINIC | Age: 54
End: 2019-08-05

## 2019-08-05 DIAGNOSIS — Z12.11 SPECIAL SCREENING FOR MALIGNANT NEOPLASMS, COLON: Primary | ICD-10-CM

## 2019-08-05 NOTE — TELEPHONE ENCOUNTER
Please sign the appropriate order. If FIT route to lab to mail card to patient. If colonoscopy route back to the team so we can mail patient the referral information.

## 2019-08-05 NOTE — TELEPHONE ENCOUNTER
Called and spoke to patient and informed her that the fisrt date in her chart here is 1/2/13.Darlene Sanchez MA/MAICOL

## 2019-08-05 NOTE — TELEPHONE ENCOUNTER
Patient is calling to speak with nurse or pcp she is filling out life insurance paperwork and would like to know the first date she was diagnosis with depression     Please call to discuss  Thank you

## 2019-08-26 ENCOUNTER — OFFICE VISIT (OUTPATIENT)
Dept: FAMILY MEDICINE | Facility: CLINIC | Age: 54
End: 2019-08-26

## 2019-08-26 VITALS
OXYGEN SATURATION: 95 % | HEART RATE: 111 BPM | BODY MASS INDEX: 30.8 KG/M2 | SYSTOLIC BLOOD PRESSURE: 113 MMHG | WEIGHT: 163 LBS | RESPIRATION RATE: 20 BRPM | TEMPERATURE: 99 F | DIASTOLIC BLOOD PRESSURE: 81 MMHG

## 2019-08-26 DIAGNOSIS — G43.719 INTRACTABLE CHRONIC MIGRAINE WITHOUT AURA AND WITHOUT STATUS MIGRAINOSUS: ICD-10-CM

## 2019-08-26 DIAGNOSIS — F33.2 MAJOR DEPRESSIVE DISORDER, RECURRENT, SEVERE WITHOUT PSYCHOTIC FEATURES (H): Primary | ICD-10-CM

## 2019-08-26 DIAGNOSIS — G47.09 OTHER INSOMNIA: ICD-10-CM

## 2019-08-26 DIAGNOSIS — I10 HYPERTENSION GOAL BP (BLOOD PRESSURE) < 140/90: ICD-10-CM

## 2019-08-26 LAB
ANION GAP SERPL CALCULATED.3IONS-SCNC: 9 MMOL/L (ref 3–14)
BUN SERPL-MCNC: 18 MG/DL (ref 7–30)
CALCIUM SERPL-MCNC: 9.8 MG/DL (ref 8.5–10.1)
CHLORIDE SERPL-SCNC: 109 MMOL/L (ref 94–109)
CO2 SERPL-SCNC: 25 MMOL/L (ref 20–32)
CREAT SERPL-MCNC: 1.08 MG/DL (ref 0.52–1.04)
GFR SERPL CREATININE-BSD FRML MDRD: 58 ML/MIN/{1.73_M2}
GLUCOSE SERPL-MCNC: 86 MG/DL (ref 70–99)
POTASSIUM SERPL-SCNC: 4.6 MMOL/L (ref 3.4–5.3)
SODIUM SERPL-SCNC: 143 MMOL/L (ref 133–144)

## 2019-08-26 PROCEDURE — 80048 BASIC METABOLIC PNL TOTAL CA: CPT | Performed by: FAMILY MEDICINE

## 2019-08-26 PROCEDURE — 99214 OFFICE O/P EST MOD 30 MIN: CPT | Performed by: FAMILY MEDICINE

## 2019-08-26 PROCEDURE — 36415 COLL VENOUS BLD VENIPUNCTURE: CPT | Performed by: FAMILY MEDICINE

## 2019-08-26 RX ORDER — BUPROPION HYDROCHLORIDE 100 MG/1
200 TABLET, EXTENDED RELEASE ORAL 2 TIMES DAILY
Qty: 360 TABLET | Refills: 1 | Status: SHIPPED | OUTPATIENT
Start: 2019-08-26 | End: 2019-11-25

## 2019-08-26 RX ORDER — METOPROLOL SUCCINATE 25 MG/1
25 TABLET, EXTENDED RELEASE ORAL DAILY
Qty: 30 TABLET | Refills: 11 | Status: SHIPPED | OUTPATIENT
Start: 2019-08-26 | End: 2019-11-25

## 2019-08-26 RX ORDER — CLONAZEPAM 1 MG/1
1 TABLET ORAL 2 TIMES DAILY PRN
Qty: 48 TABLET | Refills: 5 | Status: SHIPPED | OUTPATIENT
Start: 2019-08-26 | End: 2019-11-25

## 2019-08-26 RX ORDER — BUTALBITAL, ACETAMINOPHEN AND CAFFEINE 50; 325; 40 MG/1; MG/1; MG/1
1 TABLET ORAL EVERY 6 HOURS PRN
Qty: 30 TABLET | Refills: 1 | Status: SHIPPED | OUTPATIENT
Start: 2019-08-26 | End: 2019-11-25

## 2019-08-26 ASSESSMENT — ANXIETY QUESTIONNAIRES
IF YOU CHECKED OFF ANY PROBLEMS ON THIS QUESTIONNAIRE, HOW DIFFICULT HAVE THESE PROBLEMS MADE IT FOR YOU TO DO YOUR WORK, TAKE CARE OF THINGS AT HOME, OR GET ALONG WITH OTHER PEOPLE: SOMEWHAT DIFFICULT
6. BECOMING EASILY ANNOYED OR IRRITABLE: SEVERAL DAYS
GAD7 TOTAL SCORE: 12
2. NOT BEING ABLE TO STOP OR CONTROL WORRYING: MORE THAN HALF THE DAYS
1. FEELING NERVOUS, ANXIOUS, OR ON EDGE: SEVERAL DAYS
3. WORRYING TOO MUCH ABOUT DIFFERENT THINGS: MORE THAN HALF THE DAYS
5. BEING SO RESTLESS THAT IT IS HARD TO SIT STILL: MORE THAN HALF THE DAYS
7. FEELING AFRAID AS IF SOMETHING AWFUL MIGHT HAPPEN: MORE THAN HALF THE DAYS

## 2019-08-26 ASSESSMENT — PATIENT HEALTH QUESTIONNAIRE - PHQ9
5. POOR APPETITE OR OVEREATING: MORE THAN HALF THE DAYS
SUM OF ALL RESPONSES TO PHQ QUESTIONS 1-9: 7

## 2019-08-26 ASSESSMENT — PAIN SCALES - GENERAL: PAINLEVEL: MODERATE PAIN (4)

## 2019-08-26 NOTE — PROGRESS NOTES
Subjective     Mag Salcedo is a 53 year old female who presents to clinic today for the following health issues:    HPI   Depression and Anxiety Follow-Up    How are you doing with your depression since your last visit? Improved     How are you doing with your anxiety since your last visit?  Improved     Are you having other symptoms that might be associated with depression or anxiety? No    Have you had a significant life event? No     Do you have any concerns with your use of alcohol or other drugs? No    Social History     Tobacco Use     Smoking status: Never Smoker     Smokeless tobacco: Never Used   Substance Use Topics     Alcohol use: Yes     Comment: occ     Drug use: No     PHQ 11/7/2017 5/9/2018 2/8/2019   PHQ-9 Total Score 11 9 5   Q9: Thoughts of better off dead/self-harm past 2 weeks Several days Not at all Not at all     SRINI-7 SCORE 5/23/2017 11/7/2017 2/8/2019   Total Score - - -   Total Score 7 12 5     No flowsheet data found.  No flowsheet data found.      Suicide Assessment Five-step Evaluation and Treatment (SAFE-T)      How many servings of fruits and vegetables do you eat daily?  4 or more    On average, how many sweetened beverages do you drink each day (soda, juice, sweet tea, etc)?   0    How many days per week do you miss taking your medication? 0        Increased blood pressure and pulse, get headaches/migraines with high blood pressure/pulse     Pt with depression on wellbutrin 200 mg bid  Has been on this for a long time and is doing well    Pt with increased BP recently per home BP readings  Pt is a nurse  Notes increased headaches when her BP is up  Will start her on toprol xl 25 mg daily.  Monitor BP and pulse    Pt with migraines needs refills of meds, beta blocker may work prophylactically to decrease frequency    Pt with insomnia, uses klonopin. Here for 6 month refill.     Reviewed and updated as needed this visit by Provider         Review of Systems   ROS COMP:  Constitutional, HEENT, cardiovascular, pulmonary, gi and gu systems are negative, except as otherwise noted.      Objective    LMP 11/05/2010   There is no height or weight on file to calculate BMI.  Physical Exam   GENERAL: healthy, alert and no distress  NECK: no adenopathy, no asymmetry, masses, or scars and thyroid normal to palpation  RESP: lungs clear to auscultation - no rales, rhonchi or wheezes  CV: RRR without M?R?G  ABDOMEN: soft, nontender, no hepatosplenomegaly, no masses and bowel sounds normal  MS: no gross musculoskeletal defects noted, no edema    Diagnostic Test Results:  Labs reviewed in Epic        Assessment & Plan     1. Major depressive disorder, recurrent, severe without psychotic features (H)  Stable on meds  - buPROPion (WELLBUTRIN SR) 100 MG 12 hr tablet; Take 2 tablets (200 mg) by mouth 2 times daily For depression  Dispense: 360 tablet; Refill: 1  - clonazePAM (KLONOPIN) 1 MG tablet; Take 1 tablet (1 mg) by mouth 2 times daily as needed for anxiety Max#12/week  Dispense: 48 tablet; Refill: 5    2. Hypertension goal BP (blood pressure) < 140/90  Trial of low dose toprol xl to control BPs and hopefully migraines as well  - metoprolol succinate ER (TOPROL-XL) 25 MG 24 hr tablet; Take 1 tablet (25 mg) by mouth daily  Dispense: 30 tablet; Refill: 11  - Basic metabolic panel    3. Intractable chronic migraine without aura and without status migrainosus  refill  - butalbital-acetaminophen-caffeine (FIORICET/ESGIC) -40 MG tablet; Take 1 tablet by mouth every 6 hours as needed for headaches (avoid with klonopin)  Dispense: 30 tablet; Refill: 1    4. Other insomnia  refill  - clonazePAM (KLONOPIN) 1 MG tablet; Take 1 tablet (1 mg) by mouth 2 times daily as needed for anxiety Max#12/week  Dispense: 48 tablet; Refill: 5           No follow-ups on file.    Apoorva Apodaca MD  [facility]

## 2019-08-27 ASSESSMENT — ANXIETY QUESTIONNAIRES: GAD7 TOTAL SCORE: 12

## 2019-09-09 PROBLEM — I10 HYPERTENSION GOAL BP (BLOOD PRESSURE) < 140/90: Status: ACTIVE | Noted: 2019-09-09

## 2019-09-09 PROBLEM — G43.009 MIGRAINE WITHOUT AURA AND WITHOUT STATUS MIGRAINOSUS, NOT INTRACTABLE: Status: ACTIVE | Noted: 2019-09-09

## 2019-09-17 ENCOUNTER — TELEPHONE (OUTPATIENT)
Dept: FAMILY MEDICINE | Facility: CLINIC | Age: 54
End: 2019-09-17

## 2019-09-24 ENCOUNTER — OFFICE VISIT (OUTPATIENT)
Dept: URGENT CARE | Facility: URGENT CARE | Age: 54
End: 2019-09-24

## 2019-09-24 VITALS
BODY MASS INDEX: 26.46 KG/M2 | TEMPERATURE: 98.7 F | WEIGHT: 155 LBS | DIASTOLIC BLOOD PRESSURE: 74 MMHG | HEART RATE: 120 BPM | OXYGEN SATURATION: 95 % | RESPIRATION RATE: 18 BRPM | SYSTOLIC BLOOD PRESSURE: 110 MMHG | HEIGHT: 64 IN

## 2019-09-24 DIAGNOSIS — I67.1 BRAIN ANEURYSM: ICD-10-CM

## 2019-09-24 DIAGNOSIS — R51.9 NONINTRACTABLE HEADACHE, UNSPECIFIED CHRONICITY PATTERN, UNSPECIFIED HEADACHE TYPE: Primary | ICD-10-CM

## 2019-09-24 ASSESSMENT — MIFFLIN-ST. JEOR: SCORE: 1288.08

## 2019-09-24 NOTE — PROGRESS NOTES
Patient presents for medication for her headache pain.  She has a history of headaches and is on Wellbutrin, Klonopin, metoprolol and Fioricet. She wants stronger or different HA medications. She was seen in the emergency room 9/20 and was diagnosed with intracranial aneurysm.  She has an upcoming appointment with neurologist.  With the headache she gets confusion, weakness and lightheadedness.  Right now she states the headache is very minimal. She is fearful of what tomorrow will bring.  I told her that this is not something that we see in urgent care.  She should go back to the emergency room or see her Primary. She states she tried to get in to see her primary.  I am uncomfortable prescribing any medications in view of the fact that she was recently diagnosed with intracranial aneurysm.  She is upset that she had to pay $100 at the .  I told her I would no charge her and they will return her money and then she can go to the emergency room.    Lillian Woodson PA-C

## 2019-10-01 ENCOUNTER — TRANSFERRED RECORDS (OUTPATIENT)
Dept: HEALTH INFORMATION MANAGEMENT | Facility: CLINIC | Age: 54
End: 2019-10-01

## 2019-11-25 ENCOUNTER — OFFICE VISIT (OUTPATIENT)
Dept: FAMILY MEDICINE | Facility: CLINIC | Age: 54
End: 2019-11-25

## 2019-11-25 ENCOUNTER — TELEPHONE (OUTPATIENT)
Dept: FAMILY MEDICINE | Facility: CLINIC | Age: 54
End: 2019-11-25

## 2019-11-25 ENCOUNTER — PATIENT OUTREACH (OUTPATIENT)
Dept: FAMILY MEDICINE | Facility: CLINIC | Age: 54
End: 2019-11-25

## 2019-11-25 VITALS
RESPIRATION RATE: 20 BRPM | HEIGHT: 64 IN | DIASTOLIC BLOOD PRESSURE: 83 MMHG | BODY MASS INDEX: 28.68 KG/M2 | SYSTOLIC BLOOD PRESSURE: 128 MMHG | HEART RATE: 118 BPM | TEMPERATURE: 98.6 F | WEIGHT: 168 LBS

## 2019-11-25 DIAGNOSIS — Z12.11 SPECIAL SCREENING FOR MALIGNANT NEOPLASMS, COLON: ICD-10-CM

## 2019-11-25 DIAGNOSIS — F33.2 MAJOR DEPRESSIVE DISORDER, RECURRENT, SEVERE WITHOUT PSYCHOTIC FEATURES (H): ICD-10-CM

## 2019-11-25 DIAGNOSIS — G47.09 OTHER INSOMNIA: ICD-10-CM

## 2019-11-25 DIAGNOSIS — I10 HYPERTENSION GOAL BP (BLOOD PRESSURE) < 140/90: ICD-10-CM

## 2019-11-25 DIAGNOSIS — G43.719 INTRACTABLE CHRONIC MIGRAINE WITHOUT AURA AND WITHOUT STATUS MIGRAINOSUS: ICD-10-CM

## 2019-11-25 PROCEDURE — 99213 OFFICE O/P EST LOW 20 MIN: CPT | Performed by: FAMILY MEDICINE

## 2019-11-25 PROCEDURE — 82274 ASSAY TEST FOR BLOOD FECAL: CPT | Performed by: FAMILY MEDICINE

## 2019-11-25 RX ORDER — GABAPENTIN 300 MG/1
300 CAPSULE ORAL 3 TIMES DAILY
COMMUNITY
End: 2019-11-25

## 2019-11-25 RX ORDER — CLONAZEPAM 1 MG/1
1 TABLET ORAL 2 TIMES DAILY PRN
Qty: 48 TABLET | Refills: 5 | Status: SHIPPED | OUTPATIENT
Start: 2019-11-25 | End: 2020-04-14

## 2019-11-25 RX ORDER — METOPROLOL SUCCINATE 25 MG/1
25 TABLET, EXTENDED RELEASE ORAL DAILY
Qty: 30 TABLET | Refills: 11 | Status: ON HOLD | OUTPATIENT
Start: 2019-11-25 | End: 2020-04-23

## 2019-11-25 RX ORDER — BUTALBITAL, ACETAMINOPHEN AND CAFFEINE 50; 325; 40 MG/1; MG/1; MG/1
1 TABLET ORAL EVERY 6 HOURS PRN
Qty: 30 TABLET | Refills: 1 | Status: SHIPPED | OUTPATIENT
Start: 2019-11-25 | End: 2020-04-14

## 2019-11-25 RX ORDER — BUPROPION HYDROCHLORIDE 100 MG/1
200 TABLET, EXTENDED RELEASE ORAL 2 TIMES DAILY
Qty: 360 TABLET | Refills: 1 | Status: SHIPPED | OUTPATIENT
Start: 2019-11-25 | End: 2020-04-14

## 2019-11-25 RX ORDER — GABAPENTIN 300 MG/1
300 CAPSULE ORAL 3 TIMES DAILY
Qty: 90 CAPSULE | Refills: 5 | Status: SHIPPED | OUTPATIENT
Start: 2019-11-25 | End: 2020-04-14

## 2019-11-25 ASSESSMENT — ANXIETY QUESTIONNAIRES
IF YOU CHECKED OFF ANY PROBLEMS ON THIS QUESTIONNAIRE, HOW DIFFICULT HAVE THESE PROBLEMS MADE IT FOR YOU TO DO YOUR WORK, TAKE CARE OF THINGS AT HOME, OR GET ALONG WITH OTHER PEOPLE: SOMEWHAT DIFFICULT
5. BEING SO RESTLESS THAT IT IS HARD TO SIT STILL: SEVERAL DAYS
GAD7 TOTAL SCORE: 6
1. FEELING NERVOUS, ANXIOUS, OR ON EDGE: SEVERAL DAYS
2. NOT BEING ABLE TO STOP OR CONTROL WORRYING: SEVERAL DAYS
7. FEELING AFRAID AS IF SOMETHING AWFUL MIGHT HAPPEN: NOT AT ALL
6. BECOMING EASILY ANNOYED OR IRRITABLE: SEVERAL DAYS
3. WORRYING TOO MUCH ABOUT DIFFERENT THINGS: SEVERAL DAYS

## 2019-11-25 ASSESSMENT — PATIENT HEALTH QUESTIONNAIRE - PHQ9
SUM OF ALL RESPONSES TO PHQ QUESTIONS 1-9: 6
5. POOR APPETITE OR OVEREATING: SEVERAL DAYS

## 2019-11-25 ASSESSMENT — MIFFLIN-ST. JEOR: SCORE: 1347.04

## 2019-11-25 ASSESSMENT — ACTIVITIES OF DAILY LIVING (ADL): DEPENDENT_IADLS:: INDEPENDENT

## 2019-11-25 NOTE — PROGRESS NOTES
"SUBJECTIVE:  Mag Salcedo, a 53 year old female scheduled an appointment to discuss the following issues:  Special screening for malignant neoplasms, colon  Follow-up anxiety. No insurance.  Follow-up anxiety, depression, insomnia and migraines.   Between jobs - would like help with getting insurance. Blood pressure cuff at home - ok.   Exercise - hit/miss. No chest pain or shortness of breath.   fiorcet prn migraines - 10/x months. No menses.   No focal weakness/tingling. Some nausea and occasionally blurry vision with headaches.   Closely involved in [employer]. No currently working. No SUICIAL IDEATION OR HOMOCIDAL IDEATION OR PATRICK.   Seen by [facility] and had imaging   Per cta =Impression:   1. Negative evidence for high-grade stenosis, vessel occlusion or intraluminal thrombus.  2. Small left MCA mid M1 aneurysm at the anterior temporal branch origin.  Medical, social, surgical, and family histories reviewed.    ROS:  All other ROS negative.     OBJECTIVE:  /83   Pulse 118   Temp 98.6  F (37  C) (Oral)   Resp 20   Ht 1.626 m (5' 4\")   Wt 76.2 kg (168 lb)   LMP 11/05/2010   BMI 28.84 kg/m    EXAM:  GENERAL APPEARANCE: healthy, alert and no distress  EYES: EOMI,  PERRL  NECK: no adenopathy, no asymmetry, masses, or scars and thyroid normal to palpation  RESP: lungs clear to auscultation - no rales, rhonchi or wheezes  CV: regular rates and rhythm, normal S1 S2, no S3 or S4 and no murmur, click or rub -  ABDOMEN:  soft, nontender, no HSM or masses and bowel sounds normal  MS: extremities normal- no gross deformities noted, no evidence of inflammation in joints, FROM in all extremities.  NEURO: Normal strength and tone, sensory exam grossly normal, mentation intact and speech normal  PSYCH: mentation appears normal and affect normal/bright  PSYCH: anxious    ASSESSMENT / PLAN:    (F33.2) Major depressive disorder, recurrent, severe without psychotic features (H)  Comment: stable  Plan: buPROPion " (WELLBUTRIN SR) 100 MG 12 hr tablet,         clonazePAM (KLONOPIN) 1 MG tablet        Exercise and time with family  Will ask care coordination to help with setting up insurance    (G49.337) Intractable chronic migraine without aura and without status migrainosus  Plan: butalbital-acetaminophen-caffeine         (FIORICET/ESGIC) -40 MG tablet,         gabapentin (NEURONTIN) 300 MG capsule,         NEUROLOGY ADULT REFERRAL        Stable but small aneurysm on cta.   Patient would like different clinic - [facility] of neurology. Closely monitor blood pressure. Expected course and warning signs reviewed.     (G47.09) Other insomnia  Comment: stable  Plan: clonazePAM (KLONOPIN) 1 MG tablet        Prn. Continue avoid ALCOHOL    (I10) Hypertension goal BP (blood pressure) < 140/90  Comment: stable  Plan: metoprolol succinate ER (TOPROL-XL) 25 MG 24 hr        tablet        Reveiwed risks and side effects of medication  Chest pain or shortness of breath to er. Labs in spring.     #'s given for colonoscopy/mammogram - with insurance.     Pedro Vela MD

## 2019-11-25 NOTE — PROGRESS NOTES
Clinic Care Coordination Contact  Clinic Care Coordination Contact  OUTREACH  Referral Information:  Referral Source: Care Team  Primary Diagnosis: Psychosocial  Chief Complaint   Patient presents with     Care Team   Clinic Utilization  Difficulty keeping appointments:: No  Compliance Concerns: No  No-Show Concerns: No  No PCP office visit in Past Year: No  Utilization    Last refreshed: 11/25/2019  2:47 PM:  Hospital Admissions 0           Last refreshed: 11/25/2019  2:47 PM:  ED Visits 0           Last refreshed: 11/25/2019  2:47 PM:  No Show Count (past year) 0              Current as of: 11/25/2019  2:47 PM          Clinical Concerns:  Current Medical Concerns:  Face to face with patient. Introduced self and role. Patient is in [facility]. Has been in touch with [employer] resource Prue. Patient has no medical insurance. She will go and  paper copy of MA application. Tried without success with electronic MA application. Patient is not on disability and is not working. Patient is planning to find work going forward. Patient aware of [employer] options with private pay, MA application, and [facility] resources. 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 Patient stated she is coping. Discussed mental health resources and crisis lines or 911 for emergency. She verbalized understanding. Patient happy that her blood pressure is under control. Not taking metoprolol for last month, updated med list. Patient declined a need for Care Coordination at this time.   Current Behavioral Concerns: patient engaged in conversation  Education Provided to patient: RN CC educated about Care Coordination Services.   Health Maintenance Reviewed: Not assessed  Clinical Pathway: None    Medication Management:  Patient independent in medication management and verbalizes adherence and understanding of medication regimen.      Functional Status:  Dependent ADLs:: Independent  Dependent IADLs::  Independent  Bed or wheelchair confined:: No  Mobility Status: Independent    Living Situation:  Current living arrangement:: I live alone    Diet/Exercise/Sleep:  Diet:: Regular  Tube Feeding: No    Transportation:  Transportation concerns (GOAL):: No  Transportation means:: Regular car     Psychosocial:  Mental health DX:: Yes  Mental health DX how managed:: Medication  Mental health management concern (GOAL):: No  Informal Support system:: Children  Financial/Insurance:   Financial/Insurance concerns (GOAL):: No     Resources and Interventions:  Current Resources: as above  Community Resources: None  Supplies used at home:: None  Equipment Currently Used at Home: none  Advance Care Plan/Directive  Advanced Care Plans/Directives on file:: No  Advanced Care Plan/Directive Status: Considering Options  Referrals Placed: Community Resources     Goals: na  Patient/Caregiver understanding: yes    Plan:   1. Patient will follow AVS  2. Patient will follow up sooner should symptoms worsen, change or patient feels there is a need further care.  3. Patient declined a need for Care Coordination at this time. Patient may be referred to Care Coordination in the future if additional needs arise.  Pt encouraged to contact Care Coordinator through the clinic if situation changes and assistance is needed. No follow-up planned.    MARY ALICE Webb RN Clinic Care Coordinator   [place], [place], [place]  Phone: [phone]

## 2019-11-25 NOTE — TELEPHONE ENCOUNTER
I called Mag and I am mailing her a JEROME and she will fax it back to get records from [facility]    Olya Winkler CMA

## 2019-11-25 NOTE — NURSING NOTE
"Chief Complaint   Patient presents with     Anxiety     Depression       Initial /83   Pulse 118   Temp 98.6  F (37  C) (Oral)   Resp 20   Ht 1.626 m (5' 4\")   Wt 76.2 kg (168 lb)   LMP 11/05/2010   BMI 28.84 kg/m   Estimated body mass index is 28.84 kg/m  as calculated from the following:    Height as of this encounter: 1.626 m (5' 4\").    Weight as of this encounter: 76.2 kg (168 lb).    Olya Winkler, CMA    "

## 2019-11-26 LAB — HEMOCCULT STL QL IA: NEGATIVE

## 2019-11-26 ASSESSMENT — ANXIETY QUESTIONNAIRES: GAD7 TOTAL SCORE: 6

## 2020-02-07 ENCOUNTER — MYC MEDICAL ADVICE (OUTPATIENT)
Dept: FAMILY MEDICINE | Facility: CLINIC | Age: 55
End: 2020-02-07

## 2020-02-23 ENCOUNTER — HEALTH MAINTENANCE LETTER (OUTPATIENT)
Age: 55
End: 2020-02-23

## 2020-03-26 ENCOUNTER — VIRTUAL VISIT (OUTPATIENT)
Dept: FAMILY MEDICINE | Facility: OTHER | Age: 55
End: 2020-03-26

## 2020-03-26 NOTE — PROGRESS NOTES
"Date: 2020 08:10:22  Clinician: Willow Ware  Clinician NPI: [id number]  Patient: Mag Fuller  Patient : 1965  Patient Address: [address] [place] [address], [address], [place] [address]  Patient Phone: [phone]  Visit Protocol: URI  Patient Summary:  Mag is a 54 year old ( : 1965 ) female who initiated a Visit for COVID-19 (Coronavirus) evaluation and screening. When asked the question \"Please sign me up to receive news, health information and promotions. \", Mag responded \"Yes\".    Mag states her symptoms started today.   Her symptoms consist of ear pain, rhinitis, a cough, nasal congestion, malaise, enlarged lymph nodes, a headache, myalgia, and chills. She is experiencing difficulty breathing due to nasal congestion but she is not short of breath.   Symptom details     Nasal secretions: The color of her mucus is clear.    Cough: Mag coughs every 5-10 minutes and her cough is more bothersome at night. Phlegm comes into her throat when she coughs. She does not believe her cough is caused by post-nasal drip. The color of the phlegm is clear.     Headache: She states the headache is moderate (4-6 on a 10 point pain scale).      Mag denies having wheezing, sore throat, teeth pain, fever, and facial pain or pressure. She also denies taking antibiotic medication for the symptoms and having recent facial or sinus surgery in the past 60 days.   Precipitating events  She has not recently been exposed to someone with influenza. Mag has not been in close contact with any high risk individuals.   Pertinent COVID-19 (Coronavirus) information  Mag has not traveled internationally or to the areas where COVID-19 (Coronavirus) is widespread, including cruise ship travel in the last 14 days before the start of her symptoms.   Mag has not had a close contact with a laboratory-confirmed COVID-19 patient within 14 days of symptom onset. She also has not had a close contact with a " suspected COVID-19 patient within 14 days of symptom onset.   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   Pertinent medical history  Mag does not get yeast infections when she takes antibiotics.   Mag needs a return to work/school note.   Weight: 170 lbs   Mag does not smoke or use smokeless tobacco.   Additional information as reported by the patient (free text): I am itchy like a allergic reaction, but no allergic reaction. Freezing to death, then sweating. Don't feel like I have a temp but I do not have a thermometer. I have dry emesis.  Mostly dry cough.   Weight: 170 lbs    MEDICATIONS: Wellbutrin SR oral, Klonopin oral, butalbital-acetaminophen-caffeine-codeine oral, Neurontin oral, ALLERGIES: NKDA  Clinician Response:  Dear Mag,      Based on the information you have provided, you do have symptoms that are consistent with Coronavirus (COVID-19).   The coronavirus causes mild to severe respiratory illness with the most common symptoms including fever, cough and difficulty breathing. Unfortunately, many viruses cause similar symptoms and it can be difficult to distinguish between viruses, especially in mild cases, so we are presuming that anyone with cough or fever has coronavirus at this time.  Coronavirus/COVID-19 has reached the point of community spread in [place], meaning that we are finding the virus in people with no known exposure risk for jaziel the virus. Given the increasing commonness of coronavirus in the community we are no longer testing patients who are not critically ill.  If you are a health care worker, you should refer to your employee health office for instructions about testing and returning to work.  For everyone else who has cough or fever, you should assume you are infected with coronavirus. Since you will not be tested but have symptoms that  may be consistent with coronavirus, the CDC recommends you stay in self-isolation until these three things have happened:    You have had no fever for at least 72 hours (that is three full days of no fever without the use of medicine that reduces fevers)    AND   Other symptoms have improved (for example, when your cough or shortness of breath have improved)   AND   At least 7 days have passed since your symptoms first appeared.   How to Isolate:    Isolate yourself at home.   Do Not allow any visitors  Do Not go to work or school  Do Not go to Buddhism,  centers, shopping, or other public places.  Do Not shake hands.  Avoid close contact with others (hugging, kissing).   Protect Others:    Cover Your Mouth and Nose with a mask, disposable tissue or wash cloth to avoid spreading germs to others.  Wash your hands and face frequently with soap and water.   Managing Symptoms:    At this time, we primarily recommend Tylenol (Acetaminophen) for fever or pain. If you have liver or kidney problems, contact your primary care provider for instructions on use of tylenol. Adults can take 650 mg (two 325 mg pills) by mouth every 4-6 hours as needed OR 1,000 mg (two 500 mg pills) every 8 hours as needed. MAXIMUM DAILY DOSE: 3,000mg. For children, refer to dosing on bottle based on age or weight.   If you develop significant shortness of breath that prevents you from doing normal activities, please call 911 or proceed to the nearest emergency room and alert them immediately that you have been in self-isolation for possible coronavirus.   For more information about COVID19 and options for caring for yourself at home, please visit the CDC website at https://www.cdc.gov/coronavirus/2019-ncov/about/steps-when-sick.htmlFor more options for care at [facility], please visit our website at [email or web address]     COVID-19 (Coronavirus) General Information  With the increase in the number of  COVID-19 (Coronavirus) cases, we understand you may have some questions. Below is some helpful information on COVID-19 (Coronavirus).  How can I protect myself and others from the COVID-19 (Coronavirus)?  Because there is currently no vaccine to prevent infection, the best way to protect yourself is to avoid being exposed to this virus. Put distance between yourself and other people if COVID-19 (Coronavirus) is spreading in your community. The virus is thought to spread mainly from person-to-person.     Between people who are in close contact with one another (within about 6 about) for a prolonged period (10 minutes or longer).    Through respiratory droplets produced when an infected person coughs or sneezes.     The CDC recommends the following additional steps to protect yourself and others:     Wash your hands often with soap and water for at least 20 seconds, especially after blowing your nose, coughing, or sneezing; going to the bathroom; and before eating or preparing food.  Use an alcohol-based hand  that contains at least 60 percent alcohol if soap and water are not available.        Avoid touching your eyes, nose and mouth with unwashed hands.    Avoid close contact with people who are sick.    Stay home when you are sick.    Cover your cough or sneeze with a tissue, then throw the tissue in the trash.    Clean and disinfect frequently touched objects and surfaces.     You can help stop COVID-19 (Coronavirus) by knowing the signs and symptoms:     Fever    Cough    Shortness of breath     Contact your healthcare provider if   Develop symptoms   AND   Have been in close contact with a person known to have COVID-19 (Coronavirus) or live in or have recently traveled from an area with ongoing spread of COVID-19 (Coronavirus). Call ahead before you go to a doctor's office or emergency room. Tell them about your recent travel and your symptoms.   For the most up to date information, visit the CDC's  website.  Self-monitoring  Self-monitoring means people should monitor themselves for fever by taking their temperatures twice a day and remain alert for a cough or difficulty breathing.  It is important to check your health two times each day for 14 days after a potential exposure to a person with COVID-19 (Coronavirus) or after travel from a location where COVID-19 (Coronavirus) is widespread. If you have been exposed to a person with COVID-19 (Coronavirus), it may take up to 14 days to know if you will get sick. Follow the steps below to check and record your health.     Take your temperature with a thermometer twice a day, once in the morning and once in the evening, and watch for a cough or difficulty breathing for 14 days.    Write down your temperature and any COVID-19 symptoms you may have: feeling feverish, coughing, or difficulty breathing.    Stay home from work or school.    Do not take public transportation, taxis, or ride-shares.    Avoid crowded places (such as shopping centers and movie theaters) and limit your activities in public.    Keep your distance from others (about 6 feet or 2 meters).    If you get sick with fever, cough, or trouble breathing, contact your healthcare provider and tell them about your recent travel and/or your symptoms.    If you need to seek medical care for other reasons, such as dialysis, call ahead to your doctor and tell them about your recent travel.     Steps to help prevent the spread of COVID-19 (Coronavirus) if you are sick  If you are sick with COVID-19 (Coronavirus) or suspect you are infected with the virus that causes COVID-19 (Coronavirus), follow the steps below to help prevent the disease from spreading&nbsp;to people in your home and community.     Stay home except to get medical care. Home isolation may be started in consultation with your healthcare clinician.    Separate yourself from other people and animals in your home.    Call ahead before visiting  "your doctor if you have a medical appointment.    Wear a facemask when you are around other people.    Cover your cough and sneezes.    Clean your hands often.    Avoid sharing personal household items.    Clean and disinfect frequently touched objects and surfaces everyday.    You will need to have someone drop off medications or household supplies (if needed) at your house without coming inside or in contact with you or others living in your house.    Monitor your symptoms and seek prompt medical care if your illness is worsening (e.g. Difficulty breathing).    Discontinue home isolation only in consultation with your healthcare provider.     For more detailed and up to date information on what to do if you are sick, visit this link: What to Do If You Are Sick With COVID-19.  Do I need to be tested for COVID-19 (Coronavirus)?     Not everyone needs to be tested for COVID-19 (Coronavirus). Decisions on which patients receive testing will be based on the local spread of COVID-19 (Coronavirus) as well as the symptoms. Your healthcare provider will make the final decision on whether you should be tested.    In the meantime, if you have concerns that you may have been exposed, it is reasonable to practice \"social distancing.\"&nbsp; If you are ill with a cold or flu-like illness, please monitor your symptoms and call your healthcare provider if your symptoms worsen.    For more up to date information, visit this link: COVID-19 (Coronavirus) Frequently Asked Questions and Answers.      Diagnosis: Cough  Diagnosis ICD: R05  Prescription: benzonatate (Tessalon Perles) 100 mg oral capsule 15 capsule, 5 days supply. Take 1 capsule by mouth 3 times per day for 5 days as needed. Refills: 0, Refill as needed: no, Allow substitutions: yes  Pharmacy: [id number] - [phone] - [address] [facility] [place] [address]  "

## 2020-03-26 NOTE — PROGRESS NOTES
"Date: 2020 11:01:59  Clinician: Horacio Willoughby  Clinician NPI: [id number]  Patient: Mag Fuller  Patient : 1965  Patient Address: [address] [place] [address], [address], [place] [address]  Patient Phone: [phone]  Visit Protocol: URI  Patient Summary:  Mag is a 54 year old ( : 1965 ) female who initiated a Visit for COVID-19 (Coronavirus) evaluation and screening. When asked the question \"Please sign me up to receive news, health information and promotions. \", Mag responded \"Yes\".    Mag states her symptoms started today.   Her symptoms consist of ear pain, rhinitis, wheezing, a cough, nasal congestion, malaise, enlarged lymph nodes, tooth pain, a headache, myalgia, and chills. She is experiencing difficulty breathing due to nasal congestion but she is not short of breath.   Symptom details     Nasal secretions: The color of her mucus is clear.    Cough: Mag coughs almost every minute and her cough is more bothersome at night. Phlegm comes into her throat when she coughs. She does not believe her cough is caused by post-nasal drip. The color of the phlegm is clear.     Wheezing: Mag has not ever been diagnosed with asthma. The wheezing interferes with her normal daily activities.    Headache: She states the headache is moderate (4-6 on a 10 point pain scale).     Tooth pain: The tooth pain is not caused by a cavity, recent dental work, or other mouth problems.      Mag denies having sore throat, fever, and facial pain or pressure. She also denies taking antibiotic medication for the symptoms and having recent facial or sinus surgery in the past 60 days.   Precipitating events  She has not recently been exposed to someone with influenza. Mag has been in close contact with the following high risk individuals: immunocompromised people and adults 65 or older.   Pertinent COVID-19 (Coronavirus) information  Mag has not traveled internationally or to the areas where " COVID-19 (Coronavirus) is widespread, including cruise ship travel in the last 14 days before the start of her symptoms.   Mag has not had a close contact with a laboratory-confirmed COVID-19 patient within 14 days of symptom onset. She also has not had a close contact with a suspected COVID-19 patient within 14 days of symptom onset.   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   Triage Point(s) temporarily suspended for COVID-19 (Coronavirus) screening  Mag reported the following symptoms which were previously protocol referral points. These protocol referral points have temporarily been removed for purposes of COVID-19 (Coronavirus) screening.   Wheezing that keeps Mag from doing daily activities   Pertinent medical history  Mag does not get yeast infections when she takes antibiotics.   Mag needs a return to work/school note.   Weight: 170 lbs   Mag does not smoke or use smokeless tobacco.   Additional information as reported by the patient (free text): I had a visit this am and my symptoms are progressing rapidly. My mouth/jaw are now sore, my throat is slowly becoming sore, and I am experiencing SOB and wheezing. Also, I'm not sure why but my eyes are very itchy and I have discomfort in them, no discharge or matter noted I have art tears that I wilMy question is if I could get a refill of my Albuteral inhaler that Dr Vela prescribes me when I have common colds. I believe it would help my breathing. I have no insurance and can not afford nebs.   Weight: 170 lbs  A synchronous phone visit was initiated by the provider for the following reason: Would like to talk with patient to verify her symptoms do not require additional evaluation, talk about avoidance of Nebs and can refill albuterol, will discuss Get Well    MEDICATIONS: Neurontin oral,  butalbital-acetaminophen-caffeine-codeine oral, Klonopin oral, Wellbutrin SR oral, ALLERGIES: NKDA  Clinician Response:  Dear Mag,   Based on the information you have provided, further evaluation in urgent care is indicated. You may be seen in one of our designated urgent care sites that is prepared to see patients who have symptoms that could indicate Coronavirus (Covid-19).   For your information: At this time we will NOT perform coronavirus testing in urgent care sites. This test is currently being reserved for patients who are being admitted to the hospital.  Our locations: [facility], [place], [place], [facility], [facility], [place]), [place] and [place]  Please call [address] [place] [address] ([phone]) to schedule an urgent care appointment at one of our urgent care or walk in care sites. It is essential that you tell them to let the  know that they were referred to be seen in urgent care from [facility]  PLEASE BRING DOCUMENTATION FROM THIS COMPLETED [facility] VISIT TO YOUR URGENT CARE VISIT.      We know it can be scary to hear that you might have COVID-19. Our team can help track your symptoms and make sure you are doing ok over the next two weeks using a program called [employer] to keep in touch. When you receive an email from [employer], please consider enrolling in our monitoring program. There is no cost to you for monitoring. Here is a URL where you can learn more: [email or web address]       For more information about COVID19 and options for caring for yourself at home, please visit the CDC website at https://www.cdc.gov/coronavirus/2019-ncov/about/steps-when-sick.html  For more options for care at [facility], please visit our website at [email or web address]    Diagnosis: Cough  Diagnosis ICD: R05  Triage Notes: I reviewed the patient's history, verified their identity, and explained the Visit process.    She can  barely speak because she is coughing so much on the phone.  She has a dry cough.  She has tessalon waiting at the pharmacy for her but we discussed that without a history of asthma that with coronavirus albuterol is not shown to be helpful.  However will send her albuterol inhaler to pharmacy to try.  If this doesn't work advised to be seen in .  But because of how fast her symptoms have come on and escalated advised if getting worse at any point she needs to go to the ER.  She is ok with this. She does not currently have insurance.  Synchronous Triage: phone, status: completed, duration: 280 seconds  Prescription: albuterol sulfate (Proventil HFA) 90 mcg/actuation inhalation HFA aerosol inhaler 1 200 inhalation aerosol with adapter (proventil hfa or equivalent), 0 days supply. Inhale 2 puffs every 4 hours as needed. Refills: 0, Refill as needed: no, Allow substitutions: yes  Pharmacy: [id number] - [phone] - [address] [place] [address]

## 2020-04-03 ENCOUNTER — TELEPHONE (OUTPATIENT)
Dept: FAMILY MEDICINE | Facility: CLINIC | Age: 55
End: 2020-04-03

## 2020-04-03 DIAGNOSIS — R05.9 COUGH: Primary | ICD-10-CM

## 2020-04-03 RX ORDER — BENZONATATE 100 MG/1
CAPSULE ORAL
Qty: 15 CAPSULE | Refills: 0 | Status: ON HOLD | OUTPATIENT
Start: 2020-04-03 | End: 2020-04-23

## 2020-04-03 RX ORDER — BENZONATATE 100 MG/1
CAPSULE ORAL
COMMUNITY
Start: 2020-03-26 | End: 2020-04-03

## 2020-04-03 NOTE — TELEPHONE ENCOUNTER
Reason for Call:  Other prescription    Detailed comments: Patient calling states she was given tessalon Perles through [facility] over a week ago, and they will not give her anymore. She states she went onto [facility] and they refuse to give her a refill. States pharmacy is not getting a response from the doctor. Patient uses [facility] pharmacy in [place]. Patient wanted message sent to doctor Dane for a refill. Please advise. Thank you    Phone Number Patient can be reached at: Home number on file [phone] (home)    Best Time: ASAP    Can we leave a detailed message on this number? YES    Call taken on 4/3/2020 at 11:36 AM by Deja Pan

## 2020-04-03 NOTE — TELEPHONE ENCOUNTER
Discussed with Dr. Vela who gave verbal order for refill.  Refill sent to pharmacy.  Pt notified.  To provider to cosign.  Olya WHEAT, RN  Pedro Vela MD

## 2020-04-14 ENCOUNTER — VIRTUAL VISIT (OUTPATIENT)
Dept: FAMILY MEDICINE | Facility: CLINIC | Age: 55
End: 2020-04-14
Payer: MEDICAID

## 2020-04-14 DIAGNOSIS — G43.719 INTRACTABLE CHRONIC MIGRAINE WITHOUT AURA AND WITHOUT STATUS MIGRAINOSUS: ICD-10-CM

## 2020-04-14 DIAGNOSIS — G47.09 OTHER INSOMNIA: ICD-10-CM

## 2020-04-14 DIAGNOSIS — F33.2 MAJOR DEPRESSIVE DISORDER, RECURRENT, SEVERE WITHOUT PSYCHOTIC FEATURES (H): ICD-10-CM

## 2020-04-14 DIAGNOSIS — I10 HYPERTENSION GOAL BP (BLOOD PRESSURE) < 140/90: ICD-10-CM

## 2020-04-14 PROCEDURE — 99214 OFFICE O/P EST MOD 30 MIN: CPT | Mod: TEL | Performed by: FAMILY MEDICINE

## 2020-04-14 RX ORDER — BUTALBITAL, ACETAMINOPHEN AND CAFFEINE 50; 325; 40 MG/1; MG/1; MG/1
1 TABLET ORAL EVERY 6 HOURS PRN
Qty: 30 TABLET | Refills: 1 | Status: SHIPPED | OUTPATIENT
Start: 2020-04-14 | End: 2020-04-30

## 2020-04-14 RX ORDER — BUPROPION HYDROCHLORIDE 100 MG/1
200 TABLET, EXTENDED RELEASE ORAL 2 TIMES DAILY
Qty: 360 TABLET | Refills: 1 | Status: SHIPPED | OUTPATIENT
Start: 2020-04-14 | End: 2020-09-03

## 2020-04-14 RX ORDER — GABAPENTIN 300 MG/1
300 CAPSULE ORAL 3 TIMES DAILY
Qty: 270 CAPSULE | Refills: 1 | Status: SHIPPED | OUTPATIENT
Start: 2020-04-14 | End: 2022-02-15

## 2020-04-14 RX ORDER — CLONAZEPAM 1 MG/1
1 TABLET ORAL 2 TIMES DAILY PRN
Qty: 48 TABLET | Refills: 5 | Status: SHIPPED | OUTPATIENT
Start: 2020-04-14 | End: 2020-06-02

## 2020-04-14 NOTE — PROGRESS NOTES
"Mag Salcedo is a 54 year old female who is being evaluated via a billable telephone visit.    Follow-up anxiety, depression, insomnia and migraines.  Overall doing ok. Sleep ok. Exercise - walking more. Believes had corovid 19 per [facility] and was sick x3 weeks.    Non-smoker. No history asthma.   Appetite overall ok. No urine changes. No black/bloody stools. No SUICIAL IDEATION OR HOMOCIDAL IDEATION OR PATRICK.   Blood pressure cuff - running ok.   Works at group home.   Work note needed for this week. Mild cough - improving. No fevers.   The patient has been notified of following:     \"This telephone visit will be conducted via a call between you and your physician/provider. We have found that certain health care needs can be provided without the need for a physical exam.  This service lets us provide the care you need with a short phone conversation.  If a prescription is necessary we can send it directly to your pharmacy.  If lab work is needed we can place an order for that and you can then stop by our lab to have the test done at a later time.    Telephone visits are billed at different rates depending on your insurance coverage. During this emergency period, for some insurers they may be billed the same as an in-person visit.  Please reach out to your insurance provider with any questions.    If during the course of the call the physician/provider feels a telephone visit is not appropriate, you will not be charged for this service.\"    Patient has given verbal consent for Telephone visit?  Yes    How would you like to obtain your AVS? Mail a copy    Subjective     Mag Salcedo is a 54 year old female who presents to clinic today for the following health issues:      PROBLEMS TO ADD ON...    Patient Active Problem List   Diagnosis     Depression with anxiety     Major depressive disorder, recurrent episode, severe (H)     CARDIOVASCULAR SCREENING; LDL GOAL LESS THAN 160     Other insomnia     Major " depressive disorder, recurrent, severe without psychotic features (H)     Hypertension goal BP (blood pressure) < 140/90     Migraine without aura and without status migrainosus, not intractable     No past surgical history on file.    Social History     Tobacco Use     Smoking status: Never Smoker     Smokeless tobacco: Never Used   Substance Use Topics     Alcohol use: Yes     Comment: occ     Family History   Problem Relation Age of Onset     Coronary Artery Disease Mother         heart attack in sleep     Breast Cancer Sister            Reviewed and updated as needed this visit by Provider         Review of Systems   All other ROS negative.        Objective   Reported vitals:  LMP 11/05/2010    healthy, alert and no distress  PSYCH: Alert and oriented times 3; coherent speech, normal   rate and volume, able to articulate logical thoughts, able   to abstract reason, no tangential thoughts, no hallucinations   or delusions  Her affect is normal  RESP: No cough, no audible wheezing, able to talk in full sentences  Remainder of exam unable to be completed due to telephone visits    Diagnostic Test Results:  Labs reviewed in Epic        ASSESSMENT / PLAN:  (F33.2) Major depressive disorder, recurrent, severe without psychotic features (H)  Comment: stable  Plan: buPROPion (WELLBUTRIN SR) 100 MG 12 hr tablet,         clonazePAM (KLONOPIN) 1 MG tablet        Exercise. Avoid ALCOHOL. Reveiwed risks and side effects of medication  If SUICIAL IDEATION OR HOMOCIDAL IDEATION OR PATRICK TO ER.    (G43.719) Intractable chronic migraine without aura and without status migrainosus  Comment: stable  Plan: butalbital-acetaminophen-caffeine (ESGIC)         -40 MG tablet, gabapentin (NEURONTIN) 300        MG capsule        Prn. Avoid with klonopin. Reveiwed risks and side effects of medication      (G47.09) Other insomnia  Comment: stable  Plan: clonazePAM (KLONOPIN) 1 MG tablet            (I10) Hypertension goal BP (blood  pressure) < 140/90  Comment: stable off med  Plan: continue exercise/lower sodium diet and self-monitor. Recheck in 6 months  Sooner if worse. Restart toprol if needed.    Patient would like work note for rest of week for possible coronavirus and still some shortness of breath but overall improving. Work note done.           No follow-ups on file.      Phone call duration:  15 minutes    Pedro Vela MD

## 2020-04-14 NOTE — LETTER
[facility]  [address] [address]  Phone: [phone]    April 14, 2020        Mag Salcedo  [address]   [place] [address]          To whom it may concern:    RE: Mag Salcedo    Patient was seen and treated at our clinic and missed work. Ok return to clinic on 4/20/2020.    Please contact me for questions or concerns.      Sincerely,        Pedro Vela MD

## 2020-04-22 ENCOUNTER — HOSPITAL ENCOUNTER (OUTPATIENT)
Facility: CLINIC | Age: 55
Setting detail: OBSERVATION
Discharge: HOME | End: 2020-04-23
Attending: FAMILY MEDICINE | Admitting: FAMILY MEDICINE
Payer: MEDICAID

## 2020-04-22 ENCOUNTER — APPOINTMENT (OUTPATIENT)
Dept: GENERAL RADIOLOGY | Facility: CLINIC | Age: 55
End: 2020-04-22
Attending: FAMILY MEDICINE
Payer: MEDICAID

## 2020-04-22 ENCOUNTER — HOSPITAL ENCOUNTER (EMERGENCY)
Facility: CLINIC | Age: 55
Discharge: STILL ADMITTED OR ADMINISTRATIVE | End: 2020-04-22
Attending: FAMILY MEDICINE | Admitting: FAMILY MEDICINE
Payer: MEDICAID

## 2020-04-22 VITALS
RESPIRATION RATE: 28 BRPM | SYSTOLIC BLOOD PRESSURE: 100 MMHG | WEIGHT: 178 LBS | OXYGEN SATURATION: 97 % | DIASTOLIC BLOOD PRESSURE: 77 MMHG | TEMPERATURE: 98.6 F | HEART RATE: 89 BPM

## 2020-04-22 DIAGNOSIS — R05.9 COUGH: ICD-10-CM

## 2020-04-22 DIAGNOSIS — F41.8 DEPRESSION WITH ANXIETY: ICD-10-CM

## 2020-04-22 DIAGNOSIS — F41.9 ANXIETY: ICD-10-CM

## 2020-04-22 DIAGNOSIS — B97.89 VIRAL RESPIRATORY INFECTION: Primary | ICD-10-CM

## 2020-04-22 DIAGNOSIS — Z03.818 ENCNTR FOR OBS FOR SUSP EXPSR TO OTH BIOLG AGENTS RULED OUT: ICD-10-CM

## 2020-04-22 DIAGNOSIS — I10 HYPERTENSION GOAL BP (BLOOD PRESSURE) < 140/90: ICD-10-CM

## 2020-04-22 DIAGNOSIS — J98.8 VIRAL RESPIRATORY INFECTION: Primary | ICD-10-CM

## 2020-04-22 DIAGNOSIS — J96.01 ACUTE RESPIRATORY FAILURE WITH HYPOXIA (H): ICD-10-CM

## 2020-04-22 PROBLEM — Z20.822 SUSPECTED COVID-19 VIRUS INFECTION: Status: ACTIVE | Noted: 2020-04-22

## 2020-04-22 LAB
ALBUMIN SERPL-MCNC: 3.8 G/DL (ref 3.4–5)
ALBUMIN SERPL-MCNC: NORMAL G/DL (ref 3.4–5)
ALP SERPL-CCNC: 87 U/L (ref 40–150)
ALP SERPL-CCNC: NORMAL U/L (ref 40–150)
ALT SERPL W P-5'-P-CCNC: 30 U/L (ref 0–50)
ALT SERPL W P-5'-P-CCNC: NORMAL U/L (ref 0–50)
ANION GAP SERPL CALCULATED.3IONS-SCNC: 3 MMOL/L (ref 3–14)
ANION GAP SERPL CALCULATED.3IONS-SCNC: NORMAL MMOL/L (ref 6–17)
AST SERPL W P-5'-P-CCNC: 22 U/L (ref 0–45)
AST SERPL W P-5'-P-CCNC: NORMAL U/L (ref 0–45)
BASE DEFICIT BLDV-SCNC: NORMAL MMOL/L
BASE EXCESS BLDV CALC-SCNC: 5.4 MMOL/L
BASE EXCESS BLDV CALC-SCNC: NORMAL MMOL/L
BASOPHILS # BLD AUTO: NORMAL 10E9/L (ref 0–0.2)
BASOPHILS NFR BLD AUTO: NORMAL %
BILIRUB SERPL-MCNC: 0.4 MG/DL (ref 0.2–1.3)
BILIRUB SERPL-MCNC: NORMAL MG/DL (ref 0.2–1.3)
BUN SERPL-MCNC: 18 MG/DL (ref 7–30)
BUN SERPL-MCNC: NORMAL MG/DL (ref 7–30)
CALCIUM SERPL-MCNC: 9 MG/DL (ref 8.5–10.1)
CALCIUM SERPL-MCNC: NORMAL MG/DL (ref 8.5–10.1)
CHLORIDE SERPL-SCNC: 110 MMOL/L (ref 94–109)
CHLORIDE SERPL-SCNC: NORMAL MMOL/L (ref 94–109)
CO2 SERPL-SCNC: 28 MMOL/L (ref 20–32)
CO2 SERPL-SCNC: NORMAL MMOL/L (ref 20–32)
CREAT SERPL-MCNC: 0.98 MG/DL (ref 0.52–1.04)
CREAT SERPL-MCNC: NORMAL MG/DL (ref 0.52–1.04)
CRP SERPL-MCNC: 10.2 MG/L (ref 0–8)
CRP SERPL-MCNC: NORMAL MG/L (ref 0–8)
D DIMER PPP FEU-MCNC: 0.3 UG/ML FEU (ref 0–0.5)
D DIMER PPP FEU-MCNC: NORMAL UG/ML FEU (ref 0–0.5)
DIFFERENTIAL METHOD BLD: NORMAL
DIFFERENTIAL METHOD BLD: NORMAL
EOSINOPHIL NFR BLD AUTO: NORMAL %
ERYTHROCYTE [DISTWIDTH] IN BLOOD BY AUTOMATED COUNT: 12.4 % (ref 10–15)
ERYTHROCYTE [DISTWIDTH] IN BLOOD BY AUTOMATED COUNT: NORMAL % (ref 10–15)
GFR SERPL CREATININE-BSD FRML MDRD: 64 ML/MIN/{1.73_M2}
GFR SERPL CREATININE-BSD FRML MDRD: NORMAL ML/MIN/{1.73_M2}
GLUCOSE SERPL-MCNC: 86 MG/DL (ref 70–99)
GLUCOSE SERPL-MCNC: NORMAL MG/DL (ref 70–99)
HCO3 BLDV-SCNC: 29 MMOL/L (ref 21–28)
HCO3 BLDV-SCNC: NORMAL MMOL/L (ref 21–28)
HCT VFR BLD AUTO: 41.5 % (ref 35–47)
HCT VFR BLD AUTO: NORMAL % (ref 35–47)
HGB BLD-MCNC: 13.6 G/DL (ref 11.7–15.7)
HGB BLD-MCNC: NORMAL G/DL (ref 11.7–15.7)
IMM GRANULOCYTES # BLD: NORMAL 10E9/L (ref 0–0.4)
IMM GRANULOCYTES NFR BLD: NORMAL %
LACTATE BLD-SCNC: 1.3 MMOL/L (ref 0.7–2)
LACTATE BLD-SCNC: NORMAL MMOL/L (ref 0.7–2)
LDH SERPL L TO P-CCNC: 186 U/L (ref 81–234)
LYMPHOCYTES # BLD AUTO: NORMAL 10E9/L (ref 0.8–5.3)
LYMPHOCYTES NFR BLD AUTO: NORMAL %
MAGNESIUM SERPL-MCNC: 1.8 MG/DL (ref 1.6–2.3)
MCH RBC QN AUTO: 29.8 PG (ref 26.5–33)
MCH RBC QN AUTO: NORMAL PG (ref 26.5–33)
MCHC RBC AUTO-ENTMCNC: 32.8 G/DL (ref 31.5–36.5)
MCHC RBC AUTO-ENTMCNC: NORMAL G/DL (ref 31.5–36.5)
MCV RBC AUTO: 91 FL (ref 78–100)
MCV RBC AUTO: NORMAL FL (ref 78–100)
MONOCYTES # BLD AUTO: NORMAL 10E9/L (ref 0–1.3)
MONOCYTES NFR BLD AUTO: NORMAL %
NEUTROPHILS # BLD AUTO: NORMAL 10E9/L (ref 1.6–8.3)
NEUTROPHILS NFR BLD AUTO: NORMAL %
NRBC # BLD AUTO: NORMAL 10*3/UL
NRBC BLD AUTO-RTO: NORMAL /100
O2/TOTAL GAS SETTING VFR VENT: 21 %
O2/TOTAL GAS SETTING VFR VENT: NORMAL %
PCO2 BLDV: 40 MM HG (ref 40–50)
PCO2 BLDV: NORMAL MM HG (ref 40–50)
PH BLDV: 7.47 PH (ref 7.32–7.43)
PH BLDV: NORMAL PH (ref 7.32–7.43)
PLATELET # BLD AUTO: 310 10E9/L (ref 150–450)
PLATELET # BLD AUTO: NORMAL 10E9/L (ref 150–450)
PO2 BLDV: 56 MM HG (ref 25–47)
PO2 BLDV: NORMAL MM HG (ref 25–47)
POTASSIUM SERPL-SCNC: 4.5 MMOL/L (ref 3.4–5.3)
POTASSIUM SERPL-SCNC: NORMAL MMOL/L (ref 3.4–5.3)
PROT SERPL-MCNC: 7.2 G/DL (ref 6.8–8.8)
PROT SERPL-MCNC: NORMAL G/DL (ref 6.8–8.8)
RBC # BLD AUTO: 4.57 10E12/L (ref 3.8–5.2)
RBC # BLD AUTO: NORMAL 10E12/L (ref 3.8–5.2)
SARS-COV-2 PCR COMMENT: NORMAL
SARS-COV-2 RNA SPEC QL NAA+PROBE: NEGATIVE
SARS-COV-2 RNA SPEC QL NAA+PROBE: NORMAL
SODIUM SERPL-SCNC: 141 MMOL/L (ref 133–144)
SODIUM SERPL-SCNC: NORMAL MMOL/L (ref 133–144)
SPECIMEN SOURCE: NORMAL
SPECIMEN SOURCE: NORMAL
TROPONIN I SERPL-MCNC: <0.015 UG/L (ref 0–0.04)
WBC # BLD AUTO: 6.1 10E9/L (ref 4–11)
WBC # BLD AUTO: NORMAL 10E9/L (ref 4–11)

## 2020-04-22 PROCEDURE — 99285 EMERGENCY DEPT VISIT HI MDM: CPT | Mod: 25 | Performed by: FAMILY MEDICINE

## 2020-04-22 PROCEDURE — 80053 COMPREHEN METABOLIC PANEL: CPT | Performed by: FAMILY MEDICINE

## 2020-04-22 PROCEDURE — 96374 THER/PROPH/DIAG INJ IV PUSH: CPT

## 2020-04-22 PROCEDURE — 84484 ASSAY OF TROPONIN QUANT: CPT | Performed by: FAMILY MEDICINE

## 2020-04-22 PROCEDURE — G0378 HOSPITAL OBSERVATION PER HR: HCPCS

## 2020-04-22 PROCEDURE — 82803 BLOOD GASES ANY COMBINATION: CPT | Performed by: FAMILY MEDICINE

## 2020-04-22 PROCEDURE — 25000132 ZZH RX MED GY IP 250 OP 250 PS 637: Performed by: FAMILY MEDICINE

## 2020-04-22 PROCEDURE — 87635 SARS-COV-2 COVID-19 AMP PRB: CPT | Performed by: FAMILY MEDICINE

## 2020-04-22 PROCEDURE — 99284 EMERGENCY DEPT VISIT MOD MDM: CPT | Mod: Z6 | Performed by: FAMILY MEDICINE

## 2020-04-22 PROCEDURE — 83735 ASSAY OF MAGNESIUM: CPT | Performed by: FAMILY MEDICINE

## 2020-04-22 PROCEDURE — 85025 COMPLETE CBC W/AUTO DIFF WBC: CPT | Performed by: FAMILY MEDICINE

## 2020-04-22 PROCEDURE — 71045 X-RAY EXAM CHEST 1 VIEW: CPT | Mod: TC

## 2020-04-22 PROCEDURE — 99207 ZZC CDG-MDM COMPONENT: MEETS MODERATE - UP CODED: CPT | Performed by: FAMILY MEDICINE

## 2020-04-22 PROCEDURE — 99285 EMERGENCY DEPT VISIT HI MDM: CPT | Mod: 25,27

## 2020-04-22 PROCEDURE — 25800030 ZZH RX IP 258 OP 636: Performed by: FAMILY MEDICINE

## 2020-04-22 PROCEDURE — 94640 AIRWAY INHALATION TREATMENT: CPT

## 2020-04-22 PROCEDURE — 36415 COLL VENOUS BLD VENIPUNCTURE: CPT | Performed by: FAMILY MEDICINE

## 2020-04-22 PROCEDURE — 99220 ZZC INITIAL OBSERVATION CARE,LEVL III: CPT | Performed by: FAMILY MEDICINE

## 2020-04-22 PROCEDURE — 86140 C-REACTIVE PROTEIN: CPT | Performed by: FAMILY MEDICINE

## 2020-04-22 PROCEDURE — 83615 LACTATE (LD) (LDH) ENZYME: CPT | Performed by: FAMILY MEDICINE

## 2020-04-22 PROCEDURE — 83605 ASSAY OF LACTIC ACID: CPT | Performed by: FAMILY MEDICINE

## 2020-04-22 PROCEDURE — 85379 FIBRIN DEGRADATION QUANT: CPT | Performed by: FAMILY MEDICINE

## 2020-04-22 PROCEDURE — 25000128 H RX IP 250 OP 636: Performed by: FAMILY MEDICINE

## 2020-04-22 PROCEDURE — 87040 BLOOD CULTURE FOR BACTERIA: CPT | Performed by: FAMILY MEDICINE

## 2020-04-22 PROCEDURE — 96361 HYDRATE IV INFUSION ADD-ON: CPT

## 2020-04-22 RX ORDER — BUTALBITAL, ACETAMINOPHEN AND CAFFEINE 50; 325; 40 MG/1; MG/1; MG/1
1 TABLET ORAL EVERY 6 HOURS PRN
Status: DISCONTINUED | OUTPATIENT
Start: 2020-04-22 | End: 2020-04-22

## 2020-04-22 RX ORDER — ALBUTEROL SULFATE 90 UG/1
2 AEROSOL, METERED RESPIRATORY (INHALATION) ONCE
Status: COMPLETED | OUTPATIENT
Start: 2020-04-22 | End: 2020-04-22

## 2020-04-22 RX ORDER — LORAZEPAM 2 MG/ML
1 INJECTION INTRAMUSCULAR ONCE
Status: COMPLETED | OUTPATIENT
Start: 2020-04-22 | End: 2020-04-22

## 2020-04-22 RX ORDER — GABAPENTIN 100 MG/1
200 CAPSULE ORAL 3 TIMES DAILY
COMMUNITY
End: 2022-02-15

## 2020-04-22 RX ORDER — GABAPENTIN 300 MG/1
300 CAPSULE ORAL 3 TIMES DAILY
Status: DISCONTINUED | OUTPATIENT
Start: 2020-04-22 | End: 2020-04-23 | Stop reason: HOSPADM

## 2020-04-22 RX ORDER — SODIUM CHLORIDE 9 MG/ML
INJECTION, SOLUTION INTRAVENOUS CONTINUOUS
Status: DISCONTINUED | OUTPATIENT
Start: 2020-04-22 | End: 2020-04-23 | Stop reason: HOSPADM

## 2020-04-22 RX ORDER — CLONAZEPAM 0.5 MG/1
1 TABLET ORAL 2 TIMES DAILY PRN
Status: DISCONTINUED | OUTPATIENT
Start: 2020-04-22 | End: 2020-04-23 | Stop reason: HOSPADM

## 2020-04-22 RX ORDER — LIDOCAINE 40 MG/G
CREAM TOPICAL
Status: DISCONTINUED | OUTPATIENT
Start: 2020-04-22 | End: 2020-04-23 | Stop reason: HOSPADM

## 2020-04-22 RX ORDER — BUPROPION HYDROCHLORIDE 100 MG/1
200 TABLET, EXTENDED RELEASE ORAL 2 TIMES DAILY
Status: DISCONTINUED | OUTPATIENT
Start: 2020-04-22 | End: 2020-04-23 | Stop reason: HOSPADM

## 2020-04-22 RX ORDER — BUTALBITAL, ACETAMINOPHEN AND CAFFEINE 50; 325; 40 MG/1; MG/1; MG/1
1 CAPSULE ORAL EVERY 4 HOURS PRN
COMMUNITY
End: 2022-02-15

## 2020-04-22 RX ORDER — BENZONATATE 100 MG/1
200 CAPSULE ORAL EVERY 8 HOURS PRN
Status: DISCONTINUED | OUTPATIENT
Start: 2020-04-22 | End: 2020-04-22 | Stop reason: HOSPADM

## 2020-04-22 RX ORDER — BUPROPION HYDROCHLORIDE 200 MG/1
200 TABLET, EXTENDED RELEASE ORAL 2 TIMES DAILY
COMMUNITY
End: 2020-09-03

## 2020-04-22 RX ORDER — ACETAMINOPHEN 500 MG
1000 TABLET ORAL EVERY 6 HOURS PRN
Status: DISCONTINUED | OUTPATIENT
Start: 2020-04-22 | End: 2020-04-23 | Stop reason: HOSPADM

## 2020-04-22 RX ORDER — SODIUM CHLORIDE 9 MG/ML
1000 INJECTION, SOLUTION INTRAVENOUS CONTINUOUS
Status: DISCONTINUED | OUTPATIENT
Start: 2020-04-22 | End: 2020-04-22 | Stop reason: HOSPADM

## 2020-04-22 RX ORDER — SODIUM CHLORIDE 9 MG/ML
INJECTION, SOLUTION INTRAVENOUS CONTINUOUS
Status: DISCONTINUED | OUTPATIENT
Start: 2020-04-22 | End: 2020-04-22

## 2020-04-22 RX ORDER — BENZONATATE 100 MG/1
200 CAPSULE ORAL 3 TIMES DAILY PRN
Status: DISCONTINUED | OUTPATIENT
Start: 2020-04-22 | End: 2020-04-23 | Stop reason: HOSPADM

## 2020-04-22 RX ORDER — CLONAZEPAM 1 MG/1
1 TABLET ORAL 2 TIMES DAILY PRN
COMMUNITY
End: 2020-04-30

## 2020-04-22 RX ADMIN — ALBUTEROL SULFATE 2 PUFF: 90 AEROSOL, METERED RESPIRATORY (INHALATION) at 11:03

## 2020-04-22 RX ADMIN — GABAPENTIN 300 MG: 300 CAPSULE ORAL at 20:34

## 2020-04-22 RX ADMIN — ACETAMINOPHEN 1000 MG: 500 TABLET, FILM COATED ORAL at 23:31

## 2020-04-22 RX ADMIN — BUPROPION HYDROCHLORIDE 200 MG: 100 TABLET, EXTENDED RELEASE ORAL at 20:34

## 2020-04-22 RX ADMIN — CLONAZEPAM 1 MG: 0.5 TABLET ORAL at 20:34

## 2020-04-22 RX ADMIN — SODIUM CHLORIDE 1000 ML: 9 INJECTION, SOLUTION INTRAVENOUS at 10:56

## 2020-04-22 RX ADMIN — SODIUM CHLORIDE: 9 INJECTION, SOLUTION INTRAVENOUS at 20:15

## 2020-04-22 RX ADMIN — LORAZEPAM 1 MG: 2 INJECTION INTRAMUSCULAR; INTRAVENOUS at 10:58

## 2020-04-22 RX ADMIN — BENZONATATE 200 MG: 100 CAPSULE ORAL at 20:33

## 2020-04-22 ASSESSMENT — COLUMBIA-SUICIDE SEVERITY RATING SCALE - C-SSRS
2. HAVE YOU ACTUALLY HAD ANY THOUGHTS OF KILLING YOURSELF IN THE PAST MONTH?: NO
1. IN THE PAST MONTH, HAVE YOU WISHED YOU WERE DEAD OR WISHED YOU COULD GO TO SLEEP AND NOT WAKE UP?: NO

## 2020-04-22 ASSESSMENT — ENCOUNTER SYMPTOMS
CHILLS: 1
FATIGUE: 1
FEVER: 0

## 2020-04-22 ASSESSMENT — MIFFLIN-ST. JEOR: SCORE: 1345.68

## 2020-04-22 NOTE — ED NOTES
Patient noted to be hypoxic. Placed on 2L O2 by nasal cannula per provider. Primary RN made aware.

## 2020-04-22 NOTE — ED NOTES
ED Nursing criteria listed below was addressed during verbal handoff:     Abnormal vitals: Yes  Abnormal results: Yes  Med Reconciliation completed: Yes  Meds given in ED: Yes  Any Overdue Meds: No  Core Measures: No  Isolation: Yes  Special needs: No  Skin assessment: Yes no issues  Observation Patient  Education provided: Yes

## 2020-04-22 NOTE — PROGRESS NOTES
"S-(situation): Patient registered to Observation. Patient arrived to room 255 via cart from ED @ 1730.    B-(background): cough, shortness of breath    A-(assessment): pt A/Ox4. Lungs dim, CTA. O2 sats 95%RA. Reports general aches \"all over\". Denies short of breath at this time.    R-(recommendations): Orders and observation goals reviewed with pt    Nursing Observation criteria listed below was met:    Skin issues/needs documented:NA  Isolation needs addressed, if appropriate: Yes  Fall Prevention: Education given and documented: NA  Education Assessment documented:Yes  Education Documented (Pre-existing chronic infection such as, MRSA/VRE need education on admission): No  OBS video/handout Reviewed & DocumentedYes  Medication Reconciliation Complete: Yes  New medication patient education completed and documented (Possible Side Effects of Common Medications handout): Yes  Home medications if not able to send immediately home with family stored here: NA  Reminder note placed in discharge instructions: NA  Patient has discharge needs (If yes, please explain): No            "

## 2020-04-22 NOTE — ED TRIAGE NOTES
Recently quarantined for exposure to Covid-19.  Was told she could go back to work and today developed sudden cough with shortness of breath.

## 2020-04-22 NOTE — ED PROVIDER NOTES
History     Chief Complaint   Patient presents with     Cough     HPI  Mag Salcedo is a 54 year old female who presents to the emergency department by ambulance with cough shortness of breath fever.  Patient reported to work where she works at the [employer] home.  She worked the last 2 days.  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  She was out for most of the month with a cough and for quarantine for possible COVID.  She never was tested as she did not meet criteria.  She first did a virtual visit on 3/26.  She has been prescribed an albuterol inhaler and Tessalon Perles which have given her no improvement.  She is a non-smoker.  Denies history of asthma.  She did a virtual visit last week and was told that she could probably go back to work.  Since returning to work on Monday 2 days ago her cough has increased.  It is never been productive.  She denies any shaking chills or drenching fevers.  She has a cousin who recently  of COVID but she has had no direct contact with them.  She has been in contact with people who have been in contact with him.  She also has 2 other family members who have tested positive but has had no direct contact with them but has had contact with people who have had contact with them.    Allergies:  Allergies   Allergen Reactions     Effexor [Venlafaxine]      nausea     Topamax [Topiramate]        Problem List:    Patient Active Problem List    Diagnosis Date Noted     Hypertension goal BP (blood pressure) < 140/90 2019     Priority: Medium     Migraine without aura and without status migrainosus, not intractable 2019     Priority: Medium     Other insomnia 2015     Priority: Medium     Major depressive disorder, recurrent, severe without psychotic features (H) 2015     Priority: Medium     CARDIOVASCULAR SCREENING; LDL GOAL LESS THAN 160 2015     Priority: Medium     Depression with anxiety 2015     Priority: Medium     Major depressive  disorder, recurrent episode, severe (H) 01/30/2015     Priority: Medium     Problem list name updated by automated process. Provider to review          Past Medical History:    Past Medical History:   Diagnosis Date     History of cervical dysplasia        Past Surgical History:    No past surgical history on file.    Family History:    Family History   Problem Relation Age of Onset     Coronary Artery Disease Mother         heart attack in sleep     Breast Cancer Sister        Social History:  Marital Status:  Single [1]  Social History     Tobacco Use     Smoking status: Never Smoker     Smokeless tobacco: Never Used   Substance Use Topics     Alcohol use: Yes     Comment: occ     Drug use: No        Medications:    benzonatate (TESSALON) 100 MG capsule  buPROPion (WELLBUTRIN SR) 100 MG 12 hr tablet  butalbital-acetaminophen-caffeine (ESGIC) -40 MG tablet  clonazePAM (KLONOPIN) 1 MG tablet  EPINEPHrine (ADRENACLICK) 0.3 MG/0.3ML injection 2-pack  gabapentin (NEURONTIN) 300 MG capsule  metoprolol succinate ER (TOPROL-XL) 25 MG 24 hr tablet          Review of Systems   Constitutional: Positive for chills and fatigue. Negative for fever.       Physical Exam   BP: (!) 120/99  Pulse: 98  Heart Rate: 90  Resp: 20  SpO2: 97 %      Physical Exam  Vitals signs and nursing note reviewed.   Constitutional:       Appearance: Normal appearance.      Comments: Alert very anxious 54-year-old female with frequent near constant coughing./80   Pulse 99   LMP 11/05/2010   SpO2 99% her initial temperature was 98.6.  She remained afebrile.     HENT:      Head: Normocephalic and atraumatic.      Right Ear: Tympanic membrane, ear canal and external ear normal.      Left Ear: Tympanic membrane, ear canal and external ear normal.      Nose: Nose normal.      Mouth/Throat:      Mouth: Mucous membranes are moist.   Neck:      Musculoskeletal: Normal range of motion and neck supple.   Cardiovascular:      Rate and Rhythm:  Normal rate and regular rhythm.      Pulses: Normal pulses.      Heart sounds: Normal heart sounds.   Pulmonary:      Comments: Anxious, tachypneic, frequent coughing, no rhonchi or wheezing appreciated  Abdominal:      General: Abdomen is flat.      Palpations: Abdomen is soft.   Musculoskeletal: Normal range of motion.   Skin:     General: Skin is warm and dry.      Capillary Refill: Capillary refill takes less than 2 seconds.   Neurological:      General: No focal deficit present.      Mental Status: She is alert and oriented to person, place, and time.   Psychiatric:         Mood and Affect: Mood normal.         Behavior: Behavior normal.         ED Course        Procedures               Critical Care time:  none              Results for orders placed or performed during the hospital encounter of 04/22/20 (from the past 24 hour(s))   Blood gas venous   Result Value Ref Range    Ph Venous 7.47 (H) 7.32 - 7.43 pH    PCO2 Venous 40 40 - 50 mm Hg    PO2 Venous 56 (H) 25 - 47 mm Hg    Bicarbonate Venous 29 (H) 21 - 28 mmol/L    Base Excess Venous 5.4 mmol/L    FIO2 21    CBC with platelets differential   Result Value Ref Range    WBC 6.1 4.0 - 11.0 10e9/L    RBC Count 4.57 3.8 - 5.2 10e12/L    Hemoglobin 13.6 11.7 - 15.7 g/dL    Hematocrit 41.5 35.0 - 47.0 %    MCV 91 78 - 100 fl    MCH 29.8 26.5 - 33.0 pg    MCHC 32.8 31.5 - 36.5 g/dL    RDW 12.4 10.0 - 15.0 %    Platelet Count 310 150 - 450 10e9/L    Diff Method Automated Method    Comprehensive metabolic panel   Result Value Ref Range    Sodium 141 133 - 144 mmol/L    Potassium 4.5 3.4 - 5.3 mmol/L    Chloride 110 (H) 94 - 109 mmol/L    Carbon Dioxide 28 20 - 32 mmol/L    Anion Gap 3 3 - 14 mmol/L    Glucose 86 70 - 99 mg/dL    Urea Nitrogen 18 7 - 30 mg/dL    Creatinine 0.98 0.52 - 1.04 mg/dL    GFR Estimate 64 >60 mL/min/[1.73_m2]    GFR Estimate If Black 74 >60 mL/min/[1.73_m2]    Calcium 9.0 8.5 - 10.1 mg/dL    Bilirubin Total 0.4 0.2 - 1.3 mg/dL    Albumin  3.8 3.4 - 5.0 g/dL    Protein Total 7.2 6.8 - 8.8 g/dL    Alkaline Phosphatase 87 40 - 150 U/L    ALT 30 0 - 50 U/L    AST 22 0 - 45 U/L   CRP inflammation   Result Value Ref Range    CRP Inflammation 10.2 (H) 0.0 - 8.0 mg/L   D dimer quantitative   Result Value Ref Range    D Dimer 0.3 0.0 - 0.50 ug/ml FEU   Lactate for Sepsis Protocol   Result Value Ref Range    Lactate for Sepsis Protocol 1.3 0.7 - 2.0 mmol/L   Lactate Dehydrogenase   Result Value Ref Range    Lactate Dehydrogenase 186 81 - 234 U/L   Magnesium   Result Value Ref Range    Magnesium 1.8 1.6 - 2.3 mg/dL   Troponin I   Result Value Ref Range    Troponin I ES <0.015 0.000 - 0.045 ug/L   Blood culture    Specimen: Blood    Unspecified Site   Result Value Ref Range    Specimen Description Blood Unspecified Site     Culture Micro No growth after 1 hour    Blood culture    Specimen: Blood    Left Wrist   Result Value Ref Range    Specimen Description Blood Left Wrist     Culture Micro No growth after 1 hour    COVID-19 Virus (Coronavirus) by PCR Nasopharyngeal    Specimen: Nasopharyngeal   Result Value Ref Range    COVID-19 Virus PCR to [facility] - Source Nasopharyngeal     COVID-19 Virus PCR to [facility] - Result       Test received-See reflex to IDDL test SARS CoV2 (COVID-19) Virus RT-PCR   XR Chest Port 1 View    Narrative    XR CHEST PORT 1 VW  4/22/2020 11:24 AM       INDICATION: cough 1 month   + covid exposure  COMPARISON: None       Impression    IMPRESSION: Negative chest. The lungs are clear.    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  Patient was 98.6 on arrival.  Patient had just returned back to working duty 2 days  -Monday.  She has had a month-long history of nonproductive cough.  She has been on home isolation because of this.  She was never tested for COVID.  She did a virtual visit last week and it was felt that she was probably not well enough to return to work.  There have been no positives at work.  Her cousin  2 days ago of COVID.  She did not have any direct contact with them but has been in contact with family members who were in contact with them.  She also has 2 other family members who have tested positive but she has not been in direct contact with them but again has been in indirect contact with other family members.  All her lab test here is normal.  The patient was dropping her sats into the high to mid 80s and required supplemental oxygen by 2 L nasal cannula.  I did give her Ativan 1 mg IV and she is now resting quietly with O2 sats in the high 90s on 2 L.  She was using an albuterol inhaler and Tessalon Perles at home which she did not feel help.  She was given a inhaler treatment here with no improvement of her coughing.  Ativan and Tessalon Perles seem to help her coughing.  Cough is never productive.  Chest x-ray is clear.  With patient's history and borderline hypoxia recommend we observe her overnight till her COVID testing is back.  I do not think she should return to healthcare work at the nursing home until her respiratory symptoms have cleared with or without a positive COVID testing.  I have reviewed the nursing notes.    I have reviewed the findings, diagnosis, plan and need for follow up with the patient.       New Prescriptions    No medications on file       Final diagnoses:   Acute respiratory failure with hypoxia (H)   Cough   Anxiety   Hypertension goal BP (blood pressure) < 140/90   Depression with anxiety       2020   [facility] EMERGENCY DEPARTMENT     Melody, Trevor LAURENT MD  [phone]

## 2020-04-23 VITALS
WEIGHT: 179.3 LBS | HEART RATE: 95 BPM | TEMPERATURE: 97.7 F | OXYGEN SATURATION: 95 % | HEIGHT: 61 IN | BODY MASS INDEX: 33.85 KG/M2 | DIASTOLIC BLOOD PRESSURE: 83 MMHG | RESPIRATION RATE: 20 BRPM | SYSTOLIC BLOOD PRESSURE: 122 MMHG

## 2020-04-23 PROBLEM — R09.02 HYPOXEMIA: Status: ACTIVE | Noted: 2020-04-22

## 2020-04-23 PROBLEM — J98.8 VIRAL RESPIRATORY INFECTION: Status: ACTIVE | Noted: 2020-04-22

## 2020-04-23 PROBLEM — B97.89 VIRAL RESPIRATORY INFECTION: Status: ACTIVE | Noted: 2020-04-22

## 2020-04-23 LAB

## 2020-04-23 PROCEDURE — 99217 ZZC OBSERVATION CARE DISCHARGE: CPT | Performed by: PEDIATRICS

## 2020-04-23 PROCEDURE — 25800030 ZZH RX IP 258 OP 636: Performed by: FAMILY MEDICINE

## 2020-04-23 PROCEDURE — 87581 M.PNEUMON DNA AMP PROBE: CPT | Performed by: FAMILY MEDICINE

## 2020-04-23 PROCEDURE — 87486 CHLMYD PNEUM DNA AMP PROBE: CPT | Performed by: FAMILY MEDICINE

## 2020-04-23 PROCEDURE — 87633 RESP VIRUS 12-25 TARGETS: CPT | Performed by: FAMILY MEDICINE

## 2020-04-23 PROCEDURE — G0378 HOSPITAL OBSERVATION PER HR: HCPCS

## 2020-04-23 PROCEDURE — 25000132 ZZH RX MED GY IP 250 OP 250 PS 637: Performed by: FAMILY MEDICINE

## 2020-04-23 RX ORDER — ACETAMINOPHEN 500 MG
1000 TABLET ORAL EVERY 6 HOURS PRN
COMMUNITY
Start: 2020-04-23

## 2020-04-23 RX ORDER — BENZONATATE 100 MG/1
100 CAPSULE ORAL 3 TIMES DAILY PRN
Qty: 30 CAPSULE | Refills: 0 | Status: SHIPPED | OUTPATIENT
Start: 2020-04-23 | End: 2022-02-15

## 2020-04-23 RX ADMIN — ACETAMINOPHEN 1000 MG: 500 TABLET, FILM COATED ORAL at 09:16

## 2020-04-23 RX ADMIN — BENZONATATE 200 MG: 100 CAPSULE ORAL at 09:16

## 2020-04-23 RX ADMIN — CLONAZEPAM 1 MG: 0.5 TABLET ORAL at 09:23

## 2020-04-23 RX ADMIN — BENZONATATE 200 MG: 100 CAPSULE ORAL at 03:19

## 2020-04-23 RX ADMIN — BUPROPION HYDROCHLORIDE 200 MG: 100 TABLET, EXTENDED RELEASE ORAL at 09:16

## 2020-04-23 RX ADMIN — GABAPENTIN 300 MG: 300 CAPSULE ORAL at 09:16

## 2020-04-23 RX ADMIN — SODIUM CHLORIDE: 9 INJECTION, SOLUTION INTRAVENOUS at 04:37

## 2020-04-23 NOTE — PLAN OF CARE
Patient anxious, alert and oriented. Vitals stable, maintaining oxygenation on RA, LS diminished, frequent non-productive cough, tessalon administered with tolerable relief.  Generalized body aches reported, pain plan reviewed, MD note submitted, orders placed.  Klonopin administered per pt request for anxiety.

## 2020-04-23 NOTE — PROVIDER NOTIFICATION
Patient does not have home stock of Esgic and it is not available with pharmacy.     MD notified. Tylenol 1000mg Q6hr PRN ordered.

## 2020-04-23 NOTE — H&P
[facility]    History and Physical - Hospitalist Service       Date of Admission:  2020    Assessment & Plan   Mag Salcedo is a 54 year old female admitted on 2020. She presents from home with concerns about COVID-19.  She has been ill off and on for the past month, last month ill for 3 weeks with cough, fever, shortness of breath presumed secondary to COVID-19 although not tested.  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  Complains of a dry cough, will lead into coughing jags where she cannot breathe.  Currently denies fever, chest pain, abdominal pain.  Nauseated this morning but no vomiting.  States she had all the symptoms last month.  Patient works at a local nursing home, no COVID-19 patient's currently at that facility.  She did have a family member that she was not exposed to who  from COVID-19.  Patient has longstanding anxiety/depression with increased symptoms due to this illness and the emergency department she is afebrile with normal oxygenation at rest.  She had an episode where she coughed so hard for so long that she dropped her sats into the mid 80s.  Currently is not requiring oxygen.  Lab work is unremarkable with normal CBC and platelets.  Blood gas is normal, electrolytes normal, chest x-ray normal.  This point will register observation, COVID-19 testing is pending.  If positive, will need to speak to the COVID- 1918, although symptomatically, I am not sure she deserves ongoing hospitalization.  If negative, plan to discharge home tomorrow.  Consider testing for respiratory panel through PCR to look for other potential causes.  Tonight we will treat her cough with Tessalon Perles, fluids we will continue her depression/anxiety meds.    Suspected Covid-19 Virus Infection  Presenting with increased cough and shortness of breath  Ill last month for about  3 or 4 weeks with similar symptoms presumed secondary to COVID-19, testing not done  Reoccurrence of symptoms over the past 2 days with shortness of breath associated with this and cough, dry in nature, not associate with fever  Lab work is unremarkable with COVID-19 testing pending.  Chest x-ray normal  Having some mild hypoxemia with prolonged coughing spells otherwise oxygen saturations are normal  Observe overnight, COVID-19 testing pending.  If negative consider sending off respiratory panel PCR.  Symptomatic measures at this time with antitussives    Acute respiratory failure with hypoxia (H)  Dropping oxygen saturations with prolonged coughing spells, otherwise oxygenating normally  Monitor oxygen, will treat the cough currently not requiring oxygen    Depression with anxiety  Longstanding history, anxiety somewhat worse with prolonged illness  Continue home meds with Wellbutrin, Klonopin         Diet: Room Service  Regular Diet Adult    DVT Prophylaxis: Observation status  Domínguez Catheter: not present  Code Status: Full Code      Disposition Plan   Expected discharge: Tomorrow, recommended to prior living arrangement once COVID-19 testing complete, not requiring supplemental oxygen.  Entered: Nikhil Díaz MD 04/22/2020, 7:01 PM     The patient's care was discussed with the Bedside Nurse and Patient.    Nikhil Díaz MD  [facility]    ______________________________________________________________________    Chief Complaint   54-year-old female with cough and shortness of breath    History is obtained from the patient, electronic health record and emergency department physician    History of Present Illness   Mag Salcedo is a 54 year old female who presents with ongoing cough and shortness of breath.  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  Symptoms include nausea and vomiting,  diarrhea, body aches with headache.  Was presumed she had COVID-19 and she stated only quarantine herself.  Over the past week was feeling better and returned to work 3 days ago.  Since yesterday has had increasing cough and feeling short of air.  She denies any fever or sweating.  Shortness of breath seems worse with coughing spells to the point where she cannot catch her breath.  Taking Tessalon Perles with some benefit.  Has had some nausea but denies any vomiting.  No diarrhea at this time.  Denies any joint pain, swelling.  Is a nurse, she has not been exposed to COVID-19 through her facility.  She did have a family member, cousin who  of COVID-19.  She did not have direct contact with her although another relative visited her then visited patient.  She has not been tested for COVID-19.  Has history of hypertension, but is not taking metoprolol as it made her dizzy.  Does have history of migraine headaches for which she takes Esgic and history of anxiety/depression for which he takes Wellbutrin, and Klonopin.    Review of Systems    The 10 point Review of Systems is negative other than noted in the HPI or here.     Past Medical History    I have reviewed this patient's medical history and updated it with pertinent information if needed.   Past Medical History:   Diagnosis Date     History of cervical dysplasia        Past Surgical History   I have reviewed this patient's surgical history and updated it with pertinent information if needed.  Past Surgical History:   Procedure Laterality Date     CONIZATION CERVIX,KNIFE/LASER         Social History   I have reviewed this patient's social history and updated it with pertinent information if needed.  Social History     Tobacco Use     Smoking status: Never Smoker     Smokeless tobacco: Never Used   Substance Use Topics     Alcohol use: Yes     Comment: occ     Drug use: No       Family History   I have reviewed this patient's family history and updated it with  pertinent information if needed.   Family History   Problem Relation Age of Onset     Coronary Artery Disease Mother         heart attack in sleep     Breast Cancer Sister        Prior to Admission Medications   Prior to Admission Medications   Prescriptions Last Dose Informant Patient Reported? Taking?   EPINEPHrine (ADRENACLICK) 0.3 MG/0.3ML injection 2-pack   Yes No   Sig: Inject 0.3 mg into the muscle as needed for anaphylaxis   benzonatate (TESSALON) 100 MG capsule Past Month at Unknown time  No Yes   Sig: TAKE ONE CAPSULE BY MOUTH THREE TIMES A DAY FOR 5 DAYS AS NEEDED   buPROPion (WELLBUTRIN SR) 100 MG 12 hr tablet 4/22/2020 at am  No Yes   Sig: Take 2 tablets (200 mg) by mouth 2 times daily For depression   butalbital-acetaminophen-caffeine (ESGIC) -40 MG tablet Past Month at Unknown time  No Yes   Sig: Take 1 tablet by mouth every 6 hours as needed for headaches (avoid with klonopin)   clonazePAM (KLONOPIN) 1 MG tablet 4/22/2020 at am  No Yes   Sig: Take 1 tablet (1 mg) by mouth 2 times daily as needed for anxiety Max#12/week   gabapentin (NEURONTIN) 300 MG capsule 4/22/2020 at am  No Yes   Sig: Take 1 capsule (300 mg) by mouth 3 times daily   metoprolol succinate ER (TOPROL-XL) 25 MG 24 hr tablet   No No   Sig: Take 1 tablet (25 mg) by mouth daily   Patient not taking: Reported on 4/14/2020      Facility-Administered Medications: None     Allergies   Allergies   Allergen Reactions     Effexor [Venlafaxine]      nausea     Topamax [Topiramate]        Physical Exam   Vital Signs: Temp: 97.9  F (36.6  C) Temp src: Oral BP: 112/81 Pulse: 83 Heart Rate: 83 Resp: 18 SpO2: 96 % O2 Device: None (Room air) Oxygen Delivery: 2 LPM  Weight: 179 lbs 4.8 oz    Constitutional: awake, alert, cooperative, no apparent distress, and appears stated age and develops a cough which is prolonged and associated with inability to catch her breath.  Eyes: Lids and lashes normal, pupils equal, round and reactive to light, extra  ocular muscles intact, sclera clear, conjunctiva normal  ENT: Normocephalic, without obvious abnormality, atraumatic, sinuses nontender on palpation, external ears without lesions, oral pharynx with moist mucous membranes, tonsils without erythema or exudates, gums normal and good dentition.  Hematologic / Lymphatic: no cervical lymphadenopathy and no supraclavicular lymphadenopathy  Respiratory: No increased work of breathing, good air exchange, clear to auscultation bilaterally, no crackles or wheezing  Cardiovascular: regular rate and rhythm, no murmur noted and no edema  GI: normal bowel sounds, soft, non-distended and non-tender  Skin: no bruising or bleeding, normal skin color, texture, turgor and no redness, warmth, or swelling  Musculoskeletal: There is no redness, warmth, or swelling of the joints.  Full range of motion noted.  Motor strength is 5 out of 5 all extremities bilaterally.  Tone is normal.  Neurologic: Awake, alert, oriented to name, place and time.  Cranial nerves II-XII are grossly intact.  Motor is 5 out of 5 bilaterally.  Cerebellar finger to nose, heel to shin intact.  Sensory is intact.  Babinski down going, Romberg negative, and gait is normal.    Data   Data reviewed today: I reviewed all medications, new labs and imaging results over the last 24 hours. I personally reviewed the chest x-ray image(s) showing No signs of infiltrate or other abnormalities.    Results for orders placed or performed during the hospital encounter of 04/22/20 (from the past 24 hour(s))   Blood gas venous   Result Value Ref Range    Ph Venous 7.47 (H) 7.32 - 7.43 pH    PCO2 Venous 40 40 - 50 mm Hg    PO2 Venous 56 (H) 25 - 47 mm Hg    Bicarbonate Venous 29 (H) 21 - 28 mmol/L    Base Excess Venous 5.4 mmol/L    FIO2 21    CBC with platelets differential   Result Value Ref Range    WBC 6.1 4.0 - 11.0 10e9/L    RBC Count 4.57 3.8 - 5.2 10e12/L    Hemoglobin 13.6 11.7 - 15.7 g/dL    Hematocrit 41.5 35.0 - 47.0 %     MCV 91 78 - 100 fl    MCH 29.8 26.5 - 33.0 pg    MCHC 32.8 31.5 - 36.5 g/dL    RDW 12.4 10.0 - 15.0 %    Platelet Count 310 150 - 450 10e9/L    Diff Method Automated Method    Comprehensive metabolic panel   Result Value Ref Range    Sodium 141 133 - 144 mmol/L    Potassium 4.5 3.4 - 5.3 mmol/L    Chloride 110 (H) 94 - 109 mmol/L    Carbon Dioxide 28 20 - 32 mmol/L    Anion Gap 3 3 - 14 mmol/L    Glucose 86 70 - 99 mg/dL    Urea Nitrogen 18 7 - 30 mg/dL    Creatinine 0.98 0.52 - 1.04 mg/dL    GFR Estimate 64 >60 mL/min/[1.73_m2]    GFR Estimate If Black 74 >60 mL/min/[1.73_m2]    Calcium 9.0 8.5 - 10.1 mg/dL    Bilirubin Total 0.4 0.2 - 1.3 mg/dL    Albumin 3.8 3.4 - 5.0 g/dL    Protein Total 7.2 6.8 - 8.8 g/dL    Alkaline Phosphatase 87 40 - 150 U/L    ALT 30 0 - 50 U/L    AST 22 0 - 45 U/L   CRP inflammation   Result Value Ref Range    CRP Inflammation 10.2 (H) 0.0 - 8.0 mg/L   D dimer quantitative   Result Value Ref Range    D Dimer 0.3 0.0 - 0.50 ug/ml FEU   Lactate for Sepsis Protocol   Result Value Ref Range    Lactate for Sepsis Protocol 1.3 0.7 - 2.0 mmol/L   Lactate Dehydrogenase   Result Value Ref Range    Lactate Dehydrogenase 186 81 - 234 U/L   Magnesium   Result Value Ref Range    Magnesium 1.8 1.6 - 2.3 mg/dL   Troponin I   Result Value Ref Range    Troponin I ES <0.015 0.000 - 0.045 ug/L   Blood culture    Specimen: Blood    Unspecified Site   Result Value Ref Range    Specimen Description Blood Unspecified Site     Culture Micro No growth after 4 hours    Blood culture    Specimen: Blood    Left Wrist   Result Value Ref Range    Specimen Description Blood Left Wrist     Culture Micro No growth after 4 hours    COVID-19 Virus (Coronavirus) by PCR Nasopharyngeal    Specimen: Nasopharyngeal   Result Value Ref Range    COVID-19 Virus PCR to [facility] - Source Nasopharyngeal     COVID-19 Virus PCR to [facility] - Result       Test received-See reflex to IDDL test SARS CoV2 (COVID-19) Virus RT-PCR   XR Chest  Port 1 View    Narrative    XR CHEST PORT 1 VW  4/22/2020 11:24 AM       INDICATION: cough 1 month   + covid exposure  COMPARISON: None       Impression    IMPRESSION: Negative chest. The lungs are clear.    TREE NIEVES MD

## 2020-04-23 NOTE — PROGRESS NOTES
"Pt sleeping comfortably through most of the shift thus far  Tessalon pearls given x1 for cough.   Safe steady independent ambulation continues with out complication.   Iv is patent and infusing.  Pt remains alert and oriented x4.   Telemetry remains in place in SR with a rate in the 70s  Vitals remain stable and patient remains afebrile.   /79 (BP Location: Left arm)   Pulse 95   Temp 96.6  F (35.9  C) (Oral)   Resp 18   Ht 1.549 m (5' 1\")   Wt 81.3 kg (179 lb 4.8 oz)   LMP 11/05/2010   SpO2 97%   BMI 33.88 kg/m    Will continue to monitor and promote comfort as care continues.   "

## 2020-04-23 NOTE — PLAN OF CARE
S-(situation): Patient discharged to home via ambulatory to the ED entrance with private transportation    B-(background): Observation goals met    A-(assessment): Patient A/O x3, dyspnea on exertion, recovers quickly at rest. O2 sats remaining greater than 92% RA. Lungs diminished but CTA.     R-(recommendations): Discharge instructions reviewed with pt. Listed belongings gathered and returned to patient.  Patient Education resolved: Yes  New medications-Pt. Has been educated about reason of use and side effects Yes  Home and hospital acquired medications returned to patient NA  Medication Bin checked and emptied on discharge Yes

## 2020-04-23 NOTE — ASSESSMENT & PLAN NOTE
Longstanding history, anxiety somewhat worse with prolonged illness  Continue home meds with Wellbutrin, Klonopin

## 2020-04-23 NOTE — DISCHARGE SUMMARY
[facility]  Hospitalist Discharge Summary      Date of Admission:  4/22/2020  Date of Discharge:  4/23/2020  Discharging Provider: Phong Asher MD      Discharge Diagnoses   Principal Problem:    Viral respiratory infection  Active Problems:    Hypoxemia    Depression with anxiety      Follow-ups Needed After Discharge   Follow-up Appointments     Follow-up and recommended labs and tests       Follow up with primary care provider, Pedro Vela, within 7 days for   hospital follow- up.             Unresulted Labs Ordered in the Past 30 Days of this Admission     Date and Time Order Name Status Description    4/22/2020 1227 Blood culture Preliminary     4/22/2020 1227 Blood culture Preliminary       These results will be followed up by PCP    Discharge Disposition   Discharged to home  Condition at discharge: Stable      Hospital Course   54-year-old woman presented with approximately 3-week history of dry cough and shortness of breath and was hospitalized for observation.  Testing for COVID-19 was negative.  Respiratory viral panel PCR testing was negative.  Chest x-ray was normal.  She developed transient hypoxia while sleeping with oxygen saturations 89% on room air but maintained normal oxygenation while awake.  She improved clinically.  After investigation, recent viral respiratory tract infection was suspected from which she appeared to be continuing to recover.  Ongoing symptomatic treatment after discharge was recommended.    Consultations This Hospital Stay   None    Code Status   Full Code    Time Spent on this Encounter   I, Phong Asher MD, personally saw the patient today and spent less than or equal to 30 minutes discharging this patient.       Phong Asher MD  [facility]  ______________________________________________________________________    Physical Exam   Vital Signs: Temp: 97.7  F (36.5  C) Temp src: Oral BP: 122/83  Pulse: 95 Heart Rate: 90 Resp: 20 SpO2: 95 % O2 Device: None (Room air)   Weight: 179 lbs 4.8 oz  General Appearance: Tearful discussing her concerns about her recent illness, no respiratory distress, easily speaks full sentences  Respiratory: Normal respiratory effort, clear lungs  Cardiovascular: Regular rate and rhythm        Primary Care Physician   Pedro Vela    Discharge Orders      Reason for your hospital stay    Hospitalized due to cough and shortness of breath and improved     Follow-up and recommended labs and tests     Follow up with primary care provider, Pedro Vela, within 7 days for hospital follow- up.     Activity    Your activity upon discharge: activity as tolerated     Diet    Follow this diet upon discharge: Orders Placed This Encounter      Room Service      Regular Diet Adult       Significant Results and Procedures   Most Recent 3 CBC's:  Recent Labs   Lab Test 04/22/20  1050 05/17/15  1432   WBC 6.1 5.8   HGB 13.6 16.1*   MCV 91 87    258     Most Recent 3 BMP's:  Recent Labs   Lab Test 04/22/20  1050 08/26/19  1135 03/04/15  0833    143  --    POTASSIUM 4.5 4.6  --    CHLORIDE 110* 109  --    CO2 28 25  --    BUN 18 18  --    CR 0.98 1.08*  --    ANIONGAP 3 9  --    JUAN 9.0 9.8  --    GLC 86 86 100*     Most Recent 6 Bacteria Isolates From Any Culture (See EPIC Reports for Culture Details):  Recent Labs   Lab Test 04/22/20  1204 04/22/20  1052 05/17/15  1418   CULT No growth after 16 hours No growth after 16 hours No Beta Streptococcus isolated   ,   Results for orders placed or performed during the hospital encounter of 04/22/20   XR Chest Port 1 View    Narrative    XR CHEST PORT 1 VW  4/22/2020 11:24 AM       INDICATION: cough 1 month   + covid exposure  COMPARISON: None       Impression    IMPRESSION: Negative chest. The lungs are clear.    TREE NIEVES MD       Discharge Medications   Current Discharge Medication List      START taking these medications     Details   acetaminophen (TYLENOL) 500 MG tablet Take 2 tablets (1,000 mg) by mouth every 6 hours as needed for mild pain  Qty:      Associated Diagnoses: Viral respiratory infection         CONTINUE these medications which have CHANGED    Details   benzonatate (TESSALON) 100 MG capsule Take 1 capsule (100 mg) by mouth 3 times daily as needed for cough  Qty: 30 capsule, Refills: 0    Associated Diagnoses: Cough         CONTINUE these medications which have NOT CHANGED    Details   buPROPion (WELLBUTRIN SR) 100 MG 12 hr tablet Take 2 tablets (200 mg) by mouth 2 times daily For depression  Qty: 360 tablet, Refills: 1    Associated Diagnoses: Major depressive disorder, recurrent, severe without psychotic features (H)      butalbital-acetaminophen-caffeine (ESGIC) -40 MG tablet Take 1 tablet by mouth every 6 hours as needed for headaches (avoid with klonopin)  Qty: 30 tablet, Refills: 1    Associated Diagnoses: Intractable chronic migraine without aura and without status migrainosus      clonazePAM (KLONOPIN) 1 MG tablet Take 1 tablet (1 mg) by mouth 2 times daily as needed for anxiety Max#12/week  Qty: 48 tablet, Refills: 5    Associated Diagnoses: Other insomnia; Major depressive disorder, recurrent, severe without psychotic features (H)      gabapentin (NEURONTIN) 300 MG capsule Take 1 capsule (300 mg) by mouth 3 times daily  Qty: 270 capsule, Refills: 1    Associated Diagnoses: Intractable chronic migraine without aura and without status migrainosus      EPINEPHrine (ADRENACLICK) 0.3 MG/0.3ML injection 2-pack Inject 0.3 mg into the muscle as needed for anaphylaxis         STOP taking these medications       metoprolol succinate ER (TOPROL-XL) 25 MG 24 hr tablet Comments:   Reason for Stopping:             Allergies   Allergies   Allergen Reactions     Effexor [Venlafaxine]      nausea     Topamax [Topiramate]

## 2020-04-23 NOTE — PROVIDER NOTIFICATION
DATE:  4/22/2020   TIME OF RECEIPT FROM LAB:  1937  LAB TEST:  COVID-19  LAB VALUE:  Negative  RESULTS GIVEN WITH READ-BACK TO (PROVIDER):  Dr. Díaz  TIME LAB VALUE REPORTED TO PROVIDER:   1938

## 2020-04-23 NOTE — ASSESSMENT & PLAN NOTE
Dropping oxygen saturations with prolonged coughing spells, otherwise oxygenating normally  Monitor oxygen, will treat the cough currently not requiring oxygen

## 2020-04-23 NOTE — ASSESSMENT & PLAN NOTE
Presenting with increased cough and shortness of breath  Ill last month for about 3 or 4 weeks with similar symptoms presumed secondary to COVID-19, testing not done  Reoccurrence of symptoms over the past 2 days with shortness of breath associated with this and cough, dry in nature, not associate with fever  Lab work is unremarkable with COVID-19 testing pending.  Chest x-ray normal  Having some mild hypoxemia with prolonged coughing spells otherwise oxygen saturations are normal  Observe overnight, COVID-19 testing pending.  If negative consider sending off respiratory panel PCR.  Symptomatic measures at this time with antitussives

## 2020-04-24 ENCOUNTER — TELEPHONE (OUTPATIENT)
Dept: FAMILY MEDICINE | Facility: CLINIC | Age: 55
End: 2020-04-24

## 2020-04-24 NOTE — TELEPHONE ENCOUNTER
"  ED for acute condition Discharge Protocol    \"Hi, my name is Olya Vera RN, a registered nurse, and I am calling from [facility].  I am calling to follow up and see how things are going for you after your recent emergency visit.\"    Tell me how you are doing now that you are home?\" I have taken my medications and am feeling much better.      Discharge Instructions    \"Let's review your discharge instructions.  What is/are the follow-up recommendations?  Pt left hospital AMA    \"Has an appointment with your primary care provider been scheduled?\"  not asked, pt is feeling better    Medications    \"Tell me what changed about your medicines when you discharged?\"    na    \"What questions do you have about your medications?\"   None        Call Summary    \"What questions or concerns do you have about your recent visit and your follow-up care?\"     none    \"If you have questions or things don't continue to improve, we encourage you contact us through the main clinic number (give number).  Even if the clinic is not open, triage nurses are available 24/7 to help you.     We would like you to know that our clinic has extended hours (provide information).  We also have urgent care (provide details on closest location and hours/contact info)\"    \"Thank you for your time and take care!\"  Olya WHEAT, RN                "

## 2020-04-24 NOTE — TELEPHONE ENCOUNTER
This pt was Discharged from [facility] on 4/23/20 for Acute Respiratory Failure with Hypoxia, Viral Respiratory Infection      Please note this information was received from either [facility] or [facility]  IP daily report with Dr. Vela identified as the PCP.    A follow-up visit has not been scheduled.    Please follow-up with patient accordingly.

## 2020-04-28 LAB
BACTERIA SPEC CULT: NO GROWTH
BACTERIA SPEC CULT: NO GROWTH
SPECIMEN SOURCE: NORMAL
SPECIMEN SOURCE: NORMAL

## 2020-04-30 ENCOUNTER — VIRTUAL VISIT (OUTPATIENT)
Dept: FAMILY MEDICINE | Facility: CLINIC | Age: 55
End: 2020-04-30
Payer: MEDICAID

## 2020-04-30 DIAGNOSIS — R05.9 COUGH: Primary | ICD-10-CM

## 2020-04-30 PROBLEM — Z71.89 ADVANCED DIRECTIVES, COUNSELING/DISCUSSION: Status: ACTIVE | Noted: 2020-04-30

## 2020-04-30 PROCEDURE — 99213 OFFICE O/P EST LOW 20 MIN: CPT | Mod: 95 | Performed by: FAMILY MEDICINE

## 2020-04-30 RX ORDER — DOXYCYCLINE HYCLATE 100 MG
100 TABLET ORAL 2 TIMES DAILY
Qty: 20 TABLET | Refills: 0 | Status: SHIPPED | OUTPATIENT
Start: 2020-04-30 | End: 2020-06-02

## 2020-04-30 ASSESSMENT — ANXIETY QUESTIONNAIRES
GAD7 TOTAL SCORE: 0
5. BEING SO RESTLESS THAT IT IS HARD TO SIT STILL: NOT AT ALL
7. FEELING AFRAID AS IF SOMETHING AWFUL MIGHT HAPPEN: NOT AT ALL
3. WORRYING TOO MUCH ABOUT DIFFERENT THINGS: NOT AT ALL
2. NOT BEING ABLE TO STOP OR CONTROL WORRYING: NOT AT ALL
IF YOU CHECKED OFF ANY PROBLEMS ON THIS QUESTIONNAIRE, HOW DIFFICULT HAVE THESE PROBLEMS MADE IT FOR YOU TO DO YOUR WORK, TAKE CARE OF THINGS AT HOME, OR GET ALONG WITH OTHER PEOPLE: NOT DIFFICULT AT ALL
6. BECOMING EASILY ANNOYED OR IRRITABLE: NOT AT ALL
1. FEELING NERVOUS, ANXIOUS, OR ON EDGE: NOT AT ALL

## 2020-04-30 ASSESSMENT — PATIENT HEALTH QUESTIONNAIRE - PHQ9
5. POOR APPETITE OR OVEREATING: NOT AT ALL
SUM OF ALL RESPONSES TO PHQ QUESTIONS 1-9: 0

## 2020-04-30 NOTE — PROGRESS NOTES
"Mag Salcedo is a 54 year old female who is being evaluated via a billable telephone visit.      Follow-up hospital visit for shortness of breath/ chest pain and cough. Patient left AMA on 4/23/2020 and only had ekg - normal done. Ct/labs were planned. History anxiety/depression.   Patient was admitted overnight the day before. Was given tessalon perles at discharge. Negative viral panel including coronavirus, negative blood cx, trop, d-dimer, cbc and cmp. Given doxycycline in past for history pneumonia vs bronchitis 4 years ago.  Patient would like antibiotic for cough. Non-smoker. Tessalon perles - helpful. Albuterol mdi NOT helpful in hospital.   No fevers but some chills. Dry hacking cough. No nausea, vomiting or diarrhea. No humidifer. Lives alone. No heart issues in past. No history dvt. No travel/leg swelling. No over the counter cough meds. Sleep overall ok. Stressful from being sick. No SUICIAL IDEATION OR HOMOCIDAL IDEATION OR PATRICK.  No insurance. No chest pain. No destinee noted in past. No major snoring/apnea noted by in past.   zpak not helpful in past.    and per ED note:  54-year-old woman presented with approximately 3-week history of dry cough and shortness of breath and was hospitalized for observation.  Testing for COVID-19 was negative.  Respiratory viral panel PCR testing was negative.  Chest x-ray was normal.  She developed transient hypoxia while sleeping with oxygen saturations 89% on room air but maintained normal oxygenation while awake.  She improved clinically.  After investigation, recent viral respiratory tract infection was suspected from which she appeared to be continuing to recover.  Ongoing symptomatic treatment after discharge was recommended.  The patient has been notified of following:     \"This telephone visit will be conducted via a call between you and your physician/provider. We have found that certain health care needs can be provided without the need for a physical exam.  " "This service lets us provide the care you need with a short phone conversation.  If a prescription is necessary we can send it directly to your pharmacy.  If lab work is needed we can place an order for that and you can then stop by our lab to have the test done at a later time.    Telephone visits are billed at different rates depending on your insurance coverage. During this emergency period, for some insurers they may be billed the same as an in-person visit.  Please reach out to your insurance provider with any questions.    If during the course of the call the physician/provider feels a telephone visit is not appropriate, you will not be charged for this service.\"    Patient has given verbal consent for Telephone visit?  Yes    How would you like to obtain your AVS? Mail a copy    Subjective     Mag Salcedo is a 54 year old female who presents to clinic today for the following health issues:    HPI    [facility] F/U for SOB    PROBLEMS TO ADD ON...    Patient Active Problem List   Diagnosis     Depression with anxiety     Major depressive disorder, recurrent episode, severe (H)     CARDIOVASCULAR SCREENING; LDL GOAL LESS THAN 160     Other insomnia     Major depressive disorder, recurrent, severe without psychotic features (H)     Hypertension goal BP (blood pressure) < 140/90     Migraine without aura and without status migrainosus, not intractable     Hypoxemia     Viral respiratory infection     Advanced directives, counseling/discussion     Past Surgical History:   Procedure Laterality Date     CONIZATION CERVIX,KNIFE/LASER         Social History     Tobacco Use     Smoking status: Never Smoker     Smokeless tobacco: Never Used   Substance Use Topics     Alcohol use: Yes     Comment: occ     Family History   Problem Relation Age of Onset     Coronary Artery Disease Mother         heart attack in sleep     Breast Cancer Sister            Reviewed and updated as needed this visit by " Provider         Review of Systems   All other ROS negative.        Objective   Reported vitals:  LMP 11/05/2010    healthy, alert and no distress  PSYCH: Alert and oriented times 3; coherent speech, normal   rate and volume, able to articulate logical thoughts, able   to abstract reason, no tangential thoughts, no hallucinations   or delusions  Her affect is mildly anxious  RESP: +cough, no audible wheezing, able to talk in full sentences  Remainder of exam unable to be completed due to telephone visits          Assessment/Plan:  2. Cough  Bronchitis/covid/pneumonia/allergies?  - doxycycline hyclate (VIBRA-TABS) 100 MG tablet; Take 1 tablet (100 mg) by mouth 2 times daily for 10 days  Dispense: 20 tablet; Refill: 0  Doxy ok in past and patient requesting antibiotic. Reveiwed risks and side effects of medication  Humidifier and continue tessalon perles. Rest and push fluids. Consider sleep study for destinee in future but patient says sleeping good. Worsening shortness of breath/ chest pain/ fatigue or fevers too er. Repeat xray and consider allergist input too in future. Expected course and warning signs reviewed. Call/email with questions/concerns     Phone call duration:  17 minutes    Pedro Vela MD

## 2020-05-01 ASSESSMENT — ANXIETY QUESTIONNAIRES: GAD7 TOTAL SCORE: 0

## 2020-05-22 DIAGNOSIS — G43.719 INTRACTABLE CHRONIC MIGRAINE WITHOUT AURA AND WITHOUT STATUS MIGRAINOSUS: ICD-10-CM

## 2020-05-26 ENCOUNTER — TELEPHONE (OUTPATIENT)
Dept: FAMILY MEDICINE | Facility: CLINIC | Age: 55
End: 2020-05-26

## 2020-05-26 DIAGNOSIS — G47.09 OTHER INSOMNIA: Primary | ICD-10-CM

## 2020-05-26 RX ORDER — BUTALBITAL, ACETAMINOPHEN AND CAFFEINE 50; 325; 40 MG/1; MG/1; MG/1
TABLET ORAL
Qty: 30 TABLET | Refills: 0 | Status: SHIPPED | OUTPATIENT
Start: 2020-05-26 | End: 2020-06-25

## 2020-05-26 RX ORDER — CLONIDINE HYDROCHLORIDE 0.1 MG/1
0.1 TABLET ORAL 2 TIMES DAILY PRN
Qty: 60 TABLET | Refills: 2 | Status: SHIPPED | OUTPATIENT
Start: 2020-05-26 | End: 2020-08-31

## 2020-05-26 NOTE — TELEPHONE ENCOUNTER
Reason for Call:  Other call back    Detailed comments: patient is calling stating has stopped taking RX: clonazePAM (KLONOPIN) 1 MG tablet, would like something for her shakes shes been experiencing. Please call to discuss. Thank you.    Phone Number Patient can be reached at: Home number on file [phone] (home)    Best Time:     Can we leave a detailed message on this number? YES    Call taken on 5/26/2020 at 11:08 AM by Estefany Clark

## 2020-05-26 NOTE — TELEPHONE ENCOUNTER
We can try clonodine an old blood pressure medication that helps with night sweats and addiction issues. Prescription clonodine 0.1mg BID prn #60 refill x2 to what ever pharmacy she would like. If having bad withdrawal from klonopin can try 1/2 pill every 12 hours x3 days then 1/2 pill at bedtime x3 days then stop. Pedro Vela MD

## 2020-05-26 NOTE — TELEPHONE ENCOUNTER
Patient stopped due to felt she was getting addicted to med and the way she felt when off med.  Off med for a week and having side effects such as shortness of breath first 4 days but improved. Is now shaky, fidgity, can't sleep well, can't eat.     Wanting to go back to work, would like something to help with her shakes/tremors      To provider to advise      Libia OCONNELLN, RN, CPN

## 2020-05-26 NOTE — TELEPHONE ENCOUNTER
Butalbital-acetaminophen-caffeine refill request  Last OV Dr. Pedro Vela : 11/25/19; uses for her chronic migraines  Last filled 4/14/20 #30 with 1 refill  To MD to advise on refill request.

## 2020-05-26 NOTE — TELEPHONE ENCOUNTER
Left message on answering machine for patient to call back.  [phone]  Libia OCONNELLN, RN, CPN

## 2020-06-02 ENCOUNTER — VIRTUAL VISIT (OUTPATIENT)
Dept: FAMILY MEDICINE | Facility: CLINIC | Age: 55
End: 2020-06-02
Payer: MEDICAID

## 2020-06-02 DIAGNOSIS — F41.9 ANXIETY: Primary | ICD-10-CM

## 2020-06-02 PROCEDURE — 99214 OFFICE O/P EST MOD 30 MIN: CPT | Mod: 95 | Performed by: FAMILY MEDICINE

## 2020-06-02 RX ORDER — HYDROXYZINE PAMOATE 25 MG/1
25 CAPSULE ORAL 2 TIMES DAILY PRN
Qty: 30 CAPSULE | Refills: 1 | Status: SHIPPED | OUTPATIENT
Start: 2020-06-02 | End: 2020-07-20

## 2020-06-02 RX ORDER — BUSPIRONE HYDROCHLORIDE 5 MG/1
5 TABLET ORAL 2 TIMES DAILY
Qty: 30 TABLET | Refills: 1 | Status: SHIPPED | OUTPATIENT
Start: 2020-06-02 | End: 2020-06-25

## 2020-06-02 ASSESSMENT — ANXIETY QUESTIONNAIRES
6. BECOMING EASILY ANNOYED OR IRRITABLE: NEARLY EVERY DAY
7. FEELING AFRAID AS IF SOMETHING AWFUL MIGHT HAPPEN: NEARLY EVERY DAY
2. NOT BEING ABLE TO STOP OR CONTROL WORRYING: NEARLY EVERY DAY
1. FEELING NERVOUS, ANXIOUS, OR ON EDGE: NEARLY EVERY DAY
3. WORRYING TOO MUCH ABOUT DIFFERENT THINGS: NEARLY EVERY DAY
5. BEING SO RESTLESS THAT IT IS HARD TO SIT STILL: NEARLY EVERY DAY
IF YOU CHECKED OFF ANY PROBLEMS ON THIS QUESTIONNAIRE, HOW DIFFICULT HAVE THESE PROBLEMS MADE IT FOR YOU TO DO YOUR WORK, TAKE CARE OF THINGS AT HOME, OR GET ALONG WITH OTHER PEOPLE: EXTREMELY DIFFICULT
GAD7 TOTAL SCORE: 21

## 2020-06-02 ASSESSMENT — PATIENT HEALTH QUESTIONNAIRE - PHQ9
5. POOR APPETITE OR OVEREATING: NEARLY EVERY DAY
SUM OF ALL RESPONSES TO PHQ QUESTIONS 1-9: 25

## 2020-06-02 NOTE — PROGRESS NOTES
"Mag Salcedo is a 54 year old female who is being evaluated via a billable video visit.    Follow-up anxiety. Withdrawal concerns with stopping klonopin. History anxiety, depression, insomnia and migraines.   Placed on clonidine for withdrawls. Was on lexapro, prozac, zoloft and effexor in past.  No klonopin x 2weeks. Was on taking daily x 5-6 years. Shaking and anxious and jerking movements. Overall a little better. clonodine started 1 week ago and overall helpful. Not worked since 2 months ago because of shortness of breath/cough. Breathing/cough improving. No caffeine or ALCOHOL. Lives by self and keeping support from family/friends. No SUICIAL IDEATION OR HOMOCIDAL IDEATION OR PATRICK. Still taking wellbutrin. No buspar in past and might have been on vistaril in past.   No therapist but no insurance. Lives in apartment.   The patient has been notified of following:     \"This video visit will be conducted via a call between you and your physician/provider. We have found that certain health care needs can be provided without the need for an in-person physical exam.  This service lets us provide the care you need with a video conversation.  If a prescription is necessary we can send it directly to your pharmacy.  If lab work is needed we can place an order for that and you can then stop by our lab to have the test done at a later time.    Video visits are billed at different rates depending on your insurance coverage.  Please reach out to your insurance provider with any questions.    If during the course of the call the physician/provider feels a video visit is not appropriate, you will not be charged for this service.\"    Patient has given verbal consent for Video visit? Yes    How would you like to obtain your AVS? Nadirharezra    Patient would like the video invitation sent by: Text to cell phone: [phone]    Will anyone else be joining your video visit? No    Subjective     Mag Salcedo is a 54 year old " "female who presents today via video visit for the following health issues:    HPI    Discuss withdrawal sx and needing note.    Side effects pt was getting are finding words, shakey, jerking, biting tongue a lot having sores and swollen, restless legs, difficulty sleep, increase of depression, no appetite, difficulty eating, hard time doing activities, loss of concentration, anxiety.    Video Start Time: 4:38 PM    PROBLEMS TO ADD ON...    Patient Active Problem List   Diagnosis     Depression with anxiety     Major depressive disorder, recurrent episode, severe (H)     CARDIOVASCULAR SCREENING; LDL GOAL LESS THAN 160     Other insomnia     Major depressive disorder, recurrent, severe without psychotic features (H)     Hypertension goal BP (blood pressure) < 140/90     Migraine without aura and without status migrainosus, not intractable     Hypoxemia     Viral respiratory infection     Advanced directives, counseling/discussion     Past Surgical History:   Procedure Laterality Date     CONIZATION CERVIX,KNIFE/LASER         Social History     Tobacco Use     Smoking status: Never Smoker     Smokeless tobacco: Never Used   Substance Use Topics     Alcohol use: Yes     Comment: occ     Family History   Problem Relation Age of Onset     Coronary Artery Disease Mother         heart attack in sleep     Breast Cancer Sister            Reviewed and updated as needed this visit by Provider         Review of Systems   All other ROS negative.       Objective    LMP 11/05/2010   Estimated body mass index is 33.88 kg/m  as calculated from the following:    Height as of 4/22/20: 1.549 m (5' 1\").    Weight as of 4/22/20: 81.3 kg (179 lb 4.8 oz).  Physical Exam     GENERAL: Healthy, alert and no distress  EYES: Eyes grossly normal to inspection.  No discharge or erythema, or obvious scleral/conjunctival abnormalities.  RESP: No audible wheeze, cough, or visible cyanosis.  No visible retractions or increased work of breathing.  "   SKIN: Visible skin clear. No significant rash, abnormal pigmentation or lesions.  NEURO: Cranial nerves grossly intact.  Mentation and speech appropriate for age.  PSYCH: Mentation appears normal, affect normal/bright, judgement and insight intact, normal speech and appearance well-groomed.      Diagnostic Test Results:  Labs reviewed in Epic        ASSESSMENT / PLAN:  (F41.9) Anxiety  (primary encounter diagnosis)  Comment: needs help and having still some withdrawal from klonopin  Plan: hydrOXYzine (VISTARIL) 25 MG capsule, busPIRone        (BUSPAR) 5 MG tablet        Continue avoid ALCOHOL/caffeine and klonopin. Continue clonodine and add prn vistaril and start buspar. No insurance but recommended at least seeing psychologist. Exercise and deep breathing. If SUICIAL IDEATION OR HOMOCIDAL IDEATION OR PATRICK TO ER. Reveiwed risks and side effects of medication  Expected course and warning signs reviewed. Call/email with questions/concerns. Work note done for 2 weeks - can extend if needed. Would like update on mental health next week - sooner if new issues.       Video-Visit Details    Type of service:  Video Visit    Video End Time:4:45 PM    Originating Location (pt. Location): Home    Distant Location (provider location):  [facility]     [facility] for [facility] Visit[facility]    No follow-ups on file.       Pedro Vela MD

## 2020-06-02 NOTE — LETTER
[facility]  [address] [address]  Phone: [phone]    June 2, 2020        Mag Salcedo  [address]   [place] [address]          To whom it may concern:    RE: Mag Salcedo    Patient was seen and treated today at our clinic and missed work. Patient will not be able to return to work until 6/15/2020 because of illness.     Please contact me for questions or concerns.      Sincerely,        Pedro Vela MD

## 2020-06-03 ASSESSMENT — ANXIETY QUESTIONNAIRES: GAD7 TOTAL SCORE: 21

## 2020-06-22 DIAGNOSIS — F41.9 ANXIETY: ICD-10-CM

## 2020-06-22 DIAGNOSIS — G43.719 INTRACTABLE CHRONIC MIGRAINE WITHOUT AURA AND WITHOUT STATUS MIGRAINOSUS: ICD-10-CM

## 2020-06-25 RX ORDER — BUTALBITAL, ACETAMINOPHEN AND CAFFEINE 50; 325; 40 MG/1; MG/1; MG/1
TABLET ORAL
Qty: 30 TABLET | Refills: 0 | Status: SHIPPED | OUTPATIENT
Start: 2020-06-25 | End: 2020-07-20

## 2020-06-25 RX ORDER — BUSPIRONE HYDROCHLORIDE 5 MG/1
TABLET ORAL
Qty: 30 TABLET | Refills: 1 | Status: SHIPPED | OUTPATIENT
Start: 2020-06-25 | End: 2020-07-02

## 2020-07-02 ENCOUNTER — VIRTUAL VISIT (OUTPATIENT)
Dept: FAMILY MEDICINE | Facility: CLINIC | Age: 55
End: 2020-07-02

## 2020-07-02 DIAGNOSIS — F41.9 ANXIETY: ICD-10-CM

## 2020-07-02 DIAGNOSIS — G47.00 INSOMNIA, UNSPECIFIED TYPE: Primary | ICD-10-CM

## 2020-07-02 PROCEDURE — 99214 OFFICE O/P EST MOD 30 MIN: CPT | Mod: 95 | Performed by: FAMILY MEDICINE

## 2020-07-02 RX ORDER — ZOLPIDEM TARTRATE 5 MG/1
5 TABLET ORAL
Qty: 15 TABLET | Refills: 1 | Status: SHIPPED | OUTPATIENT
Start: 2020-07-02 | End: 2022-02-15

## 2020-07-02 RX ORDER — BUSPIRONE HYDROCHLORIDE 5 MG/1
5 TABLET ORAL 3 TIMES DAILY
Qty: 270 TABLET | Refills: 1 | Status: SHIPPED | OUTPATIENT
Start: 2020-07-02 | End: 2020-09-03

## 2020-07-02 NOTE — PROGRESS NOTES
"aMg Salcedo is a 54 year old female who is being evaluated via a billable telephone visit.    Follow-up depression/anxiety and insomnia.  buspar working overall ok but lasts 4-5 hours. Taking wellbutrin SR 200mg BID. No trazadone in past. Issues with fasting asleep. Back to work. No SUICIAL IDEATION OR HOMOCIDAL IDEATION OR PATRICK. No insurance. No ambien in past. Limited exercise. No caffeine or ALCOHOL.   The patient has been notified of following:     \"This telephone visit will be conducted via a call between you and your physician/provider. We have found that certain health care needs can be provided without the need for a physical exam.  This service lets us provide the care you need with a short phone conversation.  If a prescription is necessary we can send it directly to your pharmacy.  If lab work is needed we can place an order for that and you can then stop by our lab to have the test done at a later time.    Telephone visits are billed at different rates depending on your insurance coverage. During this emergency period, for some insurers they may be billed the same as an in-person visit.  Please reach out to your insurance provider with any questions.    If during the course of the call the physician/provider feels a telephone visit is not appropriate, you will not be charged for this service.\"    Patient has given verbal consent for Telephone visit?  Yes    What phone number would you like to be contacted at? [phone]    How would you like to obtain your AVS? Laura Barros     Mag Salcedo is a 54 year old female who presents via phone visit today for the following health issues:    HPI     Medication recheck Gabapentin    PROBLEMS TO ADD ON...    Patient Active Problem List   Diagnosis     Depression with anxiety     Major depressive disorder, recurrent episode, severe (H)     CARDIOVASCULAR SCREENING; LDL GOAL LESS THAN 160     Other insomnia     Major depressive disorder, " recurrent, severe without psychotic features (H)     Hypertension goal BP (blood pressure) < 140/90     Migraine without aura and without status migrainosus, not intractable     Hypoxemia     Viral respiratory infection     Advanced directives, counseling/discussion     Past Surgical History:   Procedure Laterality Date     CONIZATION CERVIX,KNIFE/LASER         Social History     Tobacco Use     Smoking status: Never Smoker     Smokeless tobacco: Never Used   Substance Use Topics     Alcohol use: Yes     Comment: occ     Family History   Problem Relation Age of Onset     Coronary Artery Disease Mother         heart attack in sleep     Breast Cancer Sister            Reviewed and updated as needed this visit by Provider         Review of Systems   All other ROS negative.       Objective   Reported vitals:  LMP 11/05/2010    healthy, alert and no distress  PSYCH: Alert and oriented times 3; coherent speech, normal   rate and volume, able to articulate logical thoughts, able   to abstract reason, no tangential thoughts, no hallucinations   or delusions  Her affect is normal  RESP: No cough, no audible wheezing, able to talk in full sentences  Remainder of exam unable to be completed due to telephone visits            ASSESSMENT / PLAN:  (G47.00) Insomnia, unspecified type  (primary encounter diagnosis)  Comment: needs help. Trazodone would possibly interact with wellbutrin  Plan: zolpidem (AMBIEN) 5 MG tablet        Consider sontata/lunesta too. Reveiwed risks and side effects of medication  Continue avoid ALCOHOL and don't take late in night. Expected course and warning signs reviewed. Call/email with questions/concerns. Exercise more    (F41.9) Anxiety  Comment: patient takes TID and helpful  Plan: busPIRone (BUSPAR) 5 MG tablet        Reveiwed risks and side effects of medication  Will need to see psych if not improving for second opinion. If SUICIAL IDEATION OR HOMOCIDAL IDEATION OR PATRICK TO ER. Exercise.       No  follow-ups on file.      Phone call duration:  15 minutes    Pedro Vela MD

## 2020-07-11 ENCOUNTER — MYC MEDICAL ADVICE (OUTPATIENT)
Dept: FAMILY MEDICINE | Facility: CLINIC | Age: 55
End: 2020-07-11

## 2020-07-11 DIAGNOSIS — G47.00 INSOMNIA, UNSPECIFIED TYPE: ICD-10-CM

## 2020-07-13 NOTE — TELEPHONE ENCOUNTER
Recent virtual visit 7/2/20. To provider to advise on my chart request for Ambien increase. Nina Mooney RN

## 2020-07-20 DIAGNOSIS — F41.9 ANXIETY: ICD-10-CM

## 2020-07-20 DIAGNOSIS — G43.719 INTRACTABLE CHRONIC MIGRAINE WITHOUT AURA AND WITHOUT STATUS MIGRAINOSUS: ICD-10-CM

## 2020-07-20 RX ORDER — HYDROXYZINE PAMOATE 25 MG/1
CAPSULE ORAL
Qty: 30 CAPSULE | Refills: 1 | Status: SHIPPED | OUTPATIENT
Start: 2020-07-20 | End: 2020-08-31

## 2020-07-20 RX ORDER — BUTALBITAL, ACETAMINOPHEN AND CAFFEINE 50; 325; 40 MG/1; MG/1; MG/1
TABLET ORAL
Qty: 30 TABLET | Refills: 0 | Status: SHIPPED | OUTPATIENT
Start: 2020-07-20 | End: 2020-09-03

## 2020-07-24 ENCOUNTER — TELEPHONE (OUTPATIENT)
Dept: FAMILY MEDICINE | Facility: CLINIC | Age: 55
End: 2020-07-24

## 2020-07-24 DIAGNOSIS — G47.00 INSOMNIA, UNSPECIFIED TYPE: ICD-10-CM

## 2020-07-24 NOTE — TELEPHONE ENCOUNTER
Message: Pt states she is taking 10mg PO at bedtime up to 3-4 times per week. Please update and send new rx!    Zolpidem 10mg tabs, 3-4 times a week.

## 2020-07-27 RX ORDER — ZOLPIDEM TARTRATE 10 MG/1
10 TABLET ORAL
Qty: 12 TABLET | Refills: 1 | Status: SHIPPED | OUTPATIENT
Start: 2020-07-27 | End: 2020-11-06

## 2020-07-27 NOTE — TELEPHONE ENCOUNTER
To provider to advise, patient needs a new prescription with dose and directions as below  As per 7/11 [employer] message:  We can increase to 10mg so double up dosage until you run out but I still need you to only take maximum x3/week but you can build up a tolerance      Libia OCONNELLN, RN, CPN

## 2020-08-17 ENCOUNTER — MYC MEDICAL ADVICE (OUTPATIENT)
Dept: FAMILY MEDICINE | Facility: CLINIC | Age: 55
End: 2020-08-17

## 2020-08-17 DIAGNOSIS — G47.09 OTHER INSOMNIA: Primary | ICD-10-CM

## 2020-08-17 RX ORDER — TRAZODONE HYDROCHLORIDE 50 MG/1
TABLET, FILM COATED ORAL
Qty: 10 TABLET | Refills: 1 | Status: SHIPPED | OUTPATIENT
Start: 2020-08-17 | End: 2020-09-03

## 2020-08-29 DIAGNOSIS — F41.9 ANXIETY: ICD-10-CM

## 2020-08-29 DIAGNOSIS — G47.09 OTHER INSOMNIA: ICD-10-CM

## 2020-08-31 RX ORDER — HYDROXYZINE PAMOATE 25 MG/1
CAPSULE ORAL
Qty: 30 CAPSULE | Refills: 1 | Status: SHIPPED | OUTPATIENT
Start: 2020-08-31 | End: 2022-02-15

## 2020-08-31 RX ORDER — CLONIDINE HYDROCHLORIDE 0.1 MG/1
TABLET ORAL
Qty: 60 TABLET | Refills: 2 | Status: SHIPPED | OUTPATIENT
Start: 2020-08-31 | End: 2020-12-10

## 2020-09-01 ENCOUNTER — MYC MEDICAL ADVICE (OUTPATIENT)
Dept: FAMILY MEDICINE | Facility: CLINIC | Age: 55
End: 2020-09-01

## 2020-09-01 NOTE — PROGRESS NOTES
"Mag Salcedo is a 54 year old female who is being evaluated via a billable telephone visit.    Follow-up anxiety/depression and insomnia.   Trazodone would like to increase dosage can only get 2-3 hours at trazodone.   Stopped wellbutrin and only taking one buspar in AM. Emotionally doing better. Biggest issue is sleep. No SUICIAL IDEATION OR HOMOCIDAL IDEATION OR PATRICK. No ALCOHOL. No cup coffee in AM. Exercise and no news before bedtime. Tried over the counter cbd oil and melatonin withtout help. fiorcet prn help with migraines.   The patient has been notified of following:     \"This telephone visit will be conducted via a call between you and your physician/provider. We have found that certain health care needs can be provided without the need for a physical exam.  This service lets us provide the care you need with a short phone conversation.  If a prescription is necessary we can send it directly to your pharmacy.  If lab work is needed we can place an order for that and you can then stop by our lab to have the test done at a later time.    Telephone visits are billed at different rates depending on your insurance coverage. During this emergency period, for some insurers they may be billed the same as an in-person visit.  Please reach out to your insurance provider with any questions.    If during the course of the call the physician/provider feels a telephone visit is not appropriate, you will not be charged for this service.\"    Patient has given verbal consent for Telephone visit?  Yes    What phone number would you like to be contacted at? [phone]    How would you like to obtain your AVS? Nadirhart    Subjective     Mag Salcedo is a 54 year old female who presents via phone visit today for the following health issues:    HPI    Med check      Review of Systems   All other ROS negative.        Objective          Vitals:  No vitals were obtained today due to virtual visit.    healthy, alert and no " distress  PSYCH: Alert and oriented times 3; coherent speech, normal   rate and volume, able to articulate logical thoughts, able   to abstract reason, no tangential thoughts, no hallucinations   or delusions  Her affect is normal  RESP: No cough, no audible wheezing, able to talk in full sentences  Remainder of exam unable to be completed due to telephone visits      ASSESSMENT / PLAN:  (G47.09) Other insomnia  Comment: need shelp  Plan: traZODone (DESYREL) 50 MG tablet        Will increase trazodone now off wellbutrin.. Reveiwed risks and side effects of medication  Limit caffeine and avoid ALCOHOL. Consider sleep specialist help. Reveiwed risks and side effects of medication      (G43.719) Intractable chronic migraine without aura and without status migrainosus  Comment: stable  Plan: butalbital-acetaminophen-caffeine (ESGIC)         -40 MG tablet        Rare fiorcet. Exercise. Tylenol/ibuprofen. ?beta-blocker? Reveiwed risks and side effects of medication      (F41.9) Anxiety  Comment: stable  Plan: busPIRone (BUSPAR) 5 MG tablet        Only taking one buspar. If SUICIAL IDEATION OR HOMOCIDAL IDEATION OR PATRICK TO ER. Recheck in 3 months  Sooner if worse. Call/email with questions/concerns     Pedro Vela MD     Phone call duration:  11 minutes

## 2020-09-03 ENCOUNTER — VIRTUAL VISIT (OUTPATIENT)
Dept: FAMILY MEDICINE | Facility: CLINIC | Age: 55
End: 2020-09-03

## 2020-09-03 DIAGNOSIS — F41.9 ANXIETY: ICD-10-CM

## 2020-09-03 DIAGNOSIS — G47.00 INSOMNIA, UNSPECIFIED TYPE: ICD-10-CM

## 2020-09-03 DIAGNOSIS — G47.09 OTHER INSOMNIA: ICD-10-CM

## 2020-09-03 DIAGNOSIS — G43.719 INTRACTABLE CHRONIC MIGRAINE WITHOUT AURA AND WITHOUT STATUS MIGRAINOSUS: ICD-10-CM

## 2020-09-03 PROCEDURE — 99214 OFFICE O/P EST MOD 30 MIN: CPT | Mod: 95 | Performed by: FAMILY MEDICINE

## 2020-09-03 RX ORDER — BUSPIRONE HYDROCHLORIDE 5 MG/1
TABLET ORAL
Qty: 1 TABLET | Refills: 0 | COMMUNITY
Start: 2020-09-03 | End: 2022-02-15

## 2020-09-03 RX ORDER — ZOLPIDEM TARTRATE 5 MG/1
5 TABLET ORAL
Qty: 15 TABLET | Refills: 1 | Status: CANCELLED | OUTPATIENT
Start: 2020-09-03

## 2020-09-03 RX ORDER — TRAZODONE HYDROCHLORIDE 50 MG/1
TABLET, FILM COATED ORAL
Qty: 60 TABLET | Refills: 2 | Status: SHIPPED | OUTPATIENT
Start: 2020-09-03 | End: 2020-12-21

## 2020-09-03 RX ORDER — BUTALBITAL, ACETAMINOPHEN AND CAFFEINE 50; 325; 40 MG/1; MG/1; MG/1
TABLET ORAL
Qty: 30 TABLET | Refills: 2 | Status: SHIPPED | OUTPATIENT
Start: 2020-09-03 | End: 2021-04-27

## 2020-09-03 RX ORDER — ZOLPIDEM TARTRATE 10 MG/1
10 TABLET ORAL
Qty: 12 TABLET | Refills: 1 | Status: CANCELLED | OUTPATIENT
Start: 2020-09-03

## 2020-11-06 DIAGNOSIS — G47.00 INSOMNIA, UNSPECIFIED TYPE: ICD-10-CM

## 2020-11-06 RX ORDER — ZOLPIDEM TARTRATE 10 MG/1
TABLET ORAL
Qty: 12 TABLET | Refills: 1 | Status: SHIPPED | OUTPATIENT
Start: 2020-11-06 | End: 2021-01-06

## 2020-11-06 NOTE — TELEPHONE ENCOUNTER
Routing refill request to provider for review/approval because:  Drug not on the [facility] refill protocol       Libia OCONNELLN, RN, CPN

## 2020-12-08 DIAGNOSIS — G47.09 OTHER INSOMNIA: ICD-10-CM

## 2020-12-10 RX ORDER — CLONIDINE HYDROCHLORIDE 0.1 MG/1
TABLET ORAL
Qty: 60 TABLET | Refills: 2 | Status: SHIPPED | OUTPATIENT
Start: 2020-12-10 | End: 2022-02-15

## 2020-12-10 NOTE — TELEPHONE ENCOUNTER
Routing refill request to provider for review/approval because:  BP not taken in the past 12 months.   Gillian Valenzuela BSN, RN

## 2020-12-13 ENCOUNTER — HEALTH MAINTENANCE LETTER (OUTPATIENT)
Age: 55
End: 2020-12-13

## 2020-12-21 DIAGNOSIS — G47.09 OTHER INSOMNIA: ICD-10-CM

## 2020-12-21 RX ORDER — TRAZODONE HYDROCHLORIDE 50 MG/1
TABLET, FILM COATED ORAL
Qty: 60 TABLET | Refills: 2 | Status: SHIPPED | OUTPATIENT
Start: 2020-12-21 | End: 2021-03-25

## 2021-01-05 DIAGNOSIS — G47.00 INSOMNIA, UNSPECIFIED TYPE: ICD-10-CM

## 2021-01-06 RX ORDER — ZOLPIDEM TARTRATE 10 MG/1
TABLET ORAL
Qty: 12 TABLET | Refills: 1 | Status: SHIPPED | OUTPATIENT
Start: 2021-01-06 | End: 2021-02-25

## 2021-02-25 DIAGNOSIS — G47.00 INSOMNIA, UNSPECIFIED TYPE: ICD-10-CM

## 2021-02-25 RX ORDER — ZOLPIDEM TARTRATE 10 MG/1
TABLET ORAL
Qty: 12 TABLET | Refills: 1 | Status: SHIPPED | OUTPATIENT
Start: 2021-02-25 | End: 2021-05-14

## 2021-02-25 NOTE — TELEPHONE ENCOUNTER
Routing refill request to provider for review/approval because:  Drug not on the [facility] refill protocol   Olya Vera BSN, RN

## 2021-03-24 DIAGNOSIS — G47.09 OTHER INSOMNIA: ICD-10-CM

## 2021-03-25 RX ORDER — TRAZODONE HYDROCHLORIDE 50 MG/1
TABLET, FILM COATED ORAL
Qty: 180 TABLET | Refills: 0 | Status: SHIPPED | OUTPATIENT
Start: 2021-03-25 | End: 2021-04-27

## 2021-04-11 ENCOUNTER — HEALTH MAINTENANCE LETTER (OUTPATIENT)
Age: 56
End: 2021-04-11

## 2021-04-26 DIAGNOSIS — G47.00 INSOMNIA, UNSPECIFIED TYPE: ICD-10-CM

## 2021-04-26 DIAGNOSIS — G47.09 OTHER INSOMNIA: ICD-10-CM

## 2021-04-26 DIAGNOSIS — G43.719 INTRACTABLE CHRONIC MIGRAINE WITHOUT AURA AND WITHOUT STATUS MIGRAINOSUS: ICD-10-CM

## 2021-04-27 RX ORDER — BUTALBITAL, ACETAMINOPHEN AND CAFFEINE 50; 325; 40 MG/1; MG/1; MG/1
TABLET ORAL
Qty: 30 TABLET | Refills: 0 | Status: SHIPPED | OUTPATIENT
Start: 2021-04-27 | End: 2021-08-11

## 2021-04-27 RX ORDER — TRAZODONE HYDROCHLORIDE 50 MG/1
TABLET, FILM COATED ORAL
Qty: 180 TABLET | Refills: 0 | Status: SHIPPED | OUTPATIENT
Start: 2021-04-27 | End: 2021-08-11

## 2021-04-27 RX ORDER — ZOLPIDEM TARTRATE 10 MG/1
TABLET ORAL
Qty: 12 TABLET | Refills: 1 | OUTPATIENT
Start: 2021-04-27

## 2021-05-14 ENCOUNTER — VIRTUAL VISIT (OUTPATIENT)
Dept: FAMILY MEDICINE | Facility: CLINIC | Age: 56
End: 2021-05-14

## 2021-05-14 ENCOUNTER — TELEPHONE (OUTPATIENT)
Dept: FAMILY MEDICINE | Facility: CLINIC | Age: 56
End: 2021-05-14

## 2021-05-14 DIAGNOSIS — F33.2 MAJOR DEPRESSIVE DISORDER, RECURRENT, SEVERE WITHOUT PSYCHOTIC FEATURES (H): Primary | ICD-10-CM

## 2021-05-14 DIAGNOSIS — G47.00 INSOMNIA, UNSPECIFIED TYPE: ICD-10-CM

## 2021-05-14 PROCEDURE — 99214 OFFICE O/P EST MOD 30 MIN: CPT | Mod: 95 | Performed by: FAMILY MEDICINE

## 2021-05-14 RX ORDER — ZOLPIDEM TARTRATE 10 MG/1
TABLET ORAL
Qty: 12 TABLET | Refills: 1 | Status: SHIPPED | OUTPATIENT
Start: 2021-05-14 | End: 2021-07-06

## 2021-05-14 ASSESSMENT — PATIENT HEALTH QUESTIONNAIRE - PHQ9: SUM OF ALL RESPONSES TO PHQ QUESTIONS 1-9: 4

## 2021-05-14 NOTE — PROGRESS NOTES
Tiera is a 55 year old who is being evaluated via a billable telephone visit.    Follow-up anxiety/depression, migraines and insomnia.   Overall doing ok.   Emotionally doing a lot better. buspar not needed for while. Has a lot still home. No SUICIAL IDEATION OR HOMOCIDAL IDEATION OR PATRICK.  Exercise and warmer weather.   Migraines hit/miss with fiorcet.  Trazodone and rare ambien.   Has epi-pen. Vitals. No breast lumps  What phone number would you like to be contacted at? [phone]  How would you like to obtain your AVS? MyChart    ASSESSMENT / PLAN:  (F33.2) Major depressive disorder, recurrent, severe without psychotic features (H)  (primary encounter diagnosis)  Comment: stable  Plan: continue buspar and exercise. If SUICIAL IDEATION OR HOMOCIDAL IDEATION OR PATRICK TO ER. Recheck in 6 months  Sooner if worse.  Would like physical/labs so hopefully can get insurance.   Patient might be interested in stool cards or mammogram and will try to get price quotes.     (G47.00) Insomnia, unspecified type  Comment: stabled  Plan: zolpidem (AMBIEN) 10 MG tablet        Continue trazodon and prn ambien. Exercise and limit caffeine. Avoid ALCOHOL. Reveiwed risks and side effects of medication          Subjective   Tiera is a 55 year old who presents for the following health issues    HPI       Review of Systems   All other ROS negative      Objective           Vitals:  No vitals were obtained today due to virtual visit.    Physical Exam   healthy, alert and no distress  PSYCH: Alert and oriented times 3; coherent speech, normal   rate and volume, able to articulate logical thoughts, able   to abstract reason, no tangential thoughts, no hallucinations   or delusions  Her affect is normal  RESP: No cough, no audible wheezing, able to talk in full sentences  Remainder of exam unable to be completed due to telephone visits            Phone call duration: 11 minutes

## 2021-05-14 NOTE — Clinical Note
Can we have the business office call patient and quote price of fit card, colo-guard and mammogram out of pocket price.

## 2021-05-14 NOTE — TELEPHONE ENCOUNTER
"Could we possibly have someone reach out to patient in regards to below message:    \"  Pedro Vela MD  P Trice Monsalve             Can we have the business office call patient and quote price of fit card, colo-guard and mammogram out of pocket baez.      \"    Lisa Edward - [place]     "

## 2021-06-15 ENCOUNTER — TELEPHONE (OUTPATIENT)
Dept: FAMILY MEDICINE | Facility: CLINIC | Age: 56
End: 2021-06-15

## 2021-06-15 NOTE — LETTER
Bambi 15, 2021      Mag Salcedo  [address]   [place] [address]      Your healthcare team cares about your health. To provide you with the best care,   we have reviewed your chart and based on our findings, we see that you are due to:     - FOLLOW UP: Schedule an OFFICE VISIT for a Mental Health Follow-up to help us address your specific concerns and determine the best health recommendations for you.    Before august 2021    If you have already completed these items, please contact the clinic via phone or   Gatheredtablehart so your care team can review and update your records. Thank you for   choosing [facility] for your healthcare needs. For any questions,   concerns, or to schedule an appointment please contact the clinic.       Healthy Regards,      Your [facility] Care Team

## 2021-06-15 NOTE — TELEPHONE ENCOUNTER
Patient Quality Outreach      Summary:    Patient has the following on her problem list/HM:     Depression / Dysthymia review    6 Month Remission: 4-8 month window range:   12 Month Remission: 10-14 month window range:        PHQ-9 SCORE 4/30/2020 6/2/2020 5/14/2021   PHQ-9 Total Score - - -   PHQ-9 Total Score MyChart - - -   PHQ-9 Total Score 0 25 4       If PHQ-9 recheck is 5 or more, route to provider for next steps.    Patient is due/failing the following:   Depression follow-up visit    Type of outreach:    Sent letter.    Questions for provider review:    None                                                                                                                                     Maria G Morataya MA       Chart routed to none.

## 2021-07-06 DIAGNOSIS — G47.00 INSOMNIA, UNSPECIFIED TYPE: ICD-10-CM

## 2021-07-06 RX ORDER — ZOLPIDEM TARTRATE 10 MG/1
TABLET ORAL
Qty: 12 TABLET | Refills: 1 | Status: SHIPPED | OUTPATIENT
Start: 2021-07-06 | End: 2021-09-20

## 2021-08-10 DIAGNOSIS — G43.719 INTRACTABLE CHRONIC MIGRAINE WITHOUT AURA AND WITHOUT STATUS MIGRAINOSUS: ICD-10-CM

## 2021-08-10 DIAGNOSIS — G47.09 OTHER INSOMNIA: ICD-10-CM

## 2021-08-11 RX ORDER — BUTALBITAL, ACETAMINOPHEN AND CAFFEINE 50; 325; 40 MG/1; MG/1; MG/1
TABLET ORAL
Qty: 30 TABLET | Refills: 0 | Status: SHIPPED | OUTPATIENT
Start: 2021-08-11 | End: 2021-10-21

## 2021-08-11 RX ORDER — TRAZODONE HYDROCHLORIDE 50 MG/1
TABLET, FILM COATED ORAL
Qty: 180 TABLET | Refills: 0 | Status: SHIPPED | OUTPATIENT
Start: 2021-08-11 | End: 2022-10-10

## 2021-09-20 DIAGNOSIS — G47.00 INSOMNIA, UNSPECIFIED TYPE: ICD-10-CM

## 2021-09-20 RX ORDER — ZOLPIDEM TARTRATE 10 MG/1
TABLET ORAL
Qty: 12 TABLET | Refills: 1 | Status: SHIPPED | OUTPATIENT
Start: 2021-09-20 | End: 2021-11-24

## 2021-09-20 NOTE — TELEPHONE ENCOUNTER
Routing refill request to provider for review/approval because:  Drug not on the [facility] refill protocol.       Kanika Ramirez RN  [facility]

## 2021-09-26 ENCOUNTER — HEALTH MAINTENANCE LETTER (OUTPATIENT)
Age: 56
End: 2021-09-26

## 2021-10-21 DIAGNOSIS — G43.719 INTRACTABLE CHRONIC MIGRAINE WITHOUT AURA AND WITHOUT STATUS MIGRAINOSUS: ICD-10-CM

## 2021-10-21 RX ORDER — BUTALBITAL, ACETAMINOPHEN AND CAFFEINE 50; 325; 40 MG/1; MG/1; MG/1
TABLET ORAL
Qty: 30 TABLET | Refills: 0 | Status: SHIPPED | OUTPATIENT
Start: 2021-10-21 | End: 2022-01-26

## 2021-11-24 DIAGNOSIS — G47.00 INSOMNIA, UNSPECIFIED TYPE: ICD-10-CM

## 2021-11-24 RX ORDER — ZOLPIDEM TARTRATE 10 MG/1
TABLET ORAL
Qty: 12 TABLET | Refills: 0 | Status: SHIPPED | OUTPATIENT
Start: 2021-11-24 | End: 2021-12-30

## 2021-11-24 NOTE — LETTER
November 24, 2021    Mag Salcedo  [address]   [place] [address]    Dear Mag,       We recently received a refill request for zolpidem (AMBIEN) 10 MG tablet.  We have refilled this for one time only because you are due for a:    Medication check office visit-appointment needed in the next month per Dr Vela.      Please call at your earliest convenience so that there will not be a delay with your future refills.          Thank you,   Your [facility] Team/  [phone]

## 2021-11-30 ENCOUNTER — TELEPHONE (OUTPATIENT)
Dept: FAMILY MEDICINE | Facility: CLINIC | Age: 56
End: 2021-11-30

## 2021-11-30 NOTE — TELEPHONE ENCOUNTER
Patient is asking if you will fill out a medical exemption form. She would drop this off. I told her that I think that providers are doing this. Please advise.Darlene Sanchez MA/MAICOL

## 2021-12-01 NOTE — TELEPHONE ENCOUNTER
Called and left a message for the patient to call back and ask for a TC.  Kourtney POLLACK - [place]

## 2021-12-01 NOTE — TELEPHONE ENCOUNTER
We are only allowed to give medical exceptions to covid shot if life threatening reactions in past. Pedro Vela MD

## 2021-12-30 DIAGNOSIS — G47.00 INSOMNIA, UNSPECIFIED TYPE: ICD-10-CM

## 2021-12-30 RX ORDER — ZOLPIDEM TARTRATE 10 MG/1
TABLET ORAL
Qty: 12 TABLET | Refills: 0 | Status: SHIPPED | OUTPATIENT
Start: 2021-12-30 | End: 2022-01-26

## 2021-12-30 NOTE — LETTER
December 31, 2021    Mag Salcedo  [address]   [place] [address]    Dear Mag,       We recently received a refill request for zolpidem (AMBIEN) 10 MG tablet.  We have refilled this for one time only because you are due for a:    Medication check office visit-this is needed in the next 1-2 weeks per Dr Vela. This can be a video visit.      Please call at your earliest convenience so that there will not be a delay with your future refills.          Thank you,   Your [facility] Team/  [phone]

## 2022-01-26 ENCOUNTER — TELEPHONE (OUTPATIENT)
Dept: FAMILY MEDICINE | Facility: CLINIC | Age: 57
End: 2022-01-26

## 2022-01-26 DIAGNOSIS — G47.00 INSOMNIA, UNSPECIFIED TYPE: ICD-10-CM

## 2022-01-26 DIAGNOSIS — G43.719 INTRACTABLE CHRONIC MIGRAINE WITHOUT AURA AND WITHOUT STATUS MIGRAINOSUS: ICD-10-CM

## 2022-01-26 RX ORDER — BUTALBITAL, ACETAMINOPHEN AND CAFFEINE 50; 325; 40 MG/1; MG/1; MG/1
TABLET ORAL
Qty: 30 TABLET | Refills: 0 | Status: SHIPPED | OUTPATIENT
Start: 2022-01-26 | End: 2022-02-15

## 2022-01-26 RX ORDER — ZOLPIDEM TARTRATE 10 MG/1
TABLET ORAL
Qty: 12 TABLET | Refills: 0 | Status: SHIPPED | OUTPATIENT
Start: 2022-01-26 | End: 2022-02-15

## 2022-01-26 RX ORDER — ZOLPIDEM TARTRATE 10 MG/1
TABLET ORAL
Qty: 12 TABLET | Refills: 0 | OUTPATIENT
Start: 2022-01-26

## 2022-01-26 NOTE — TELEPHONE ENCOUNTER
Reason for Call:  Other prescription    Detailed comments: Looking to get a refill on prescriptions prior to appt for the refill, she is almost out and could not find an appt until 2/15/22. The medications needing refills are Butabital and ambien 10mg medication refill to be sent to [facility] in [place]     Phone Number Patient can be reached at: Cell number on file:    Telephone Information:   Mobile [phone]       Best Time: anytime    Can we leave a detailed message on this number? YES    Call taken on 1/26/2022 at 5:17 PM by Analisa Sims

## 2022-02-15 ENCOUNTER — VIRTUAL VISIT (OUTPATIENT)
Dept: FAMILY MEDICINE | Facility: CLINIC | Age: 57
End: 2022-02-15

## 2022-02-15 DIAGNOSIS — R05.9 COUGH: ICD-10-CM

## 2022-02-15 DIAGNOSIS — G43.719 INTRACTABLE CHRONIC MIGRAINE WITHOUT AURA AND WITHOUT STATUS MIGRAINOSUS: ICD-10-CM

## 2022-02-15 DIAGNOSIS — G47.00 INSOMNIA, UNSPECIFIED TYPE: ICD-10-CM

## 2022-02-15 PROCEDURE — 99214 OFFICE O/P EST MOD 30 MIN: CPT | Mod: 95 | Performed by: FAMILY MEDICINE

## 2022-02-15 RX ORDER — BENZONATATE 100 MG/1
100 CAPSULE ORAL 3 TIMES DAILY PRN
Qty: 30 CAPSULE | Refills: 2 | Status: SHIPPED | OUTPATIENT
Start: 2022-02-15 | End: 2022-10-10

## 2022-02-15 RX ORDER — BUTALBITAL, ACETAMINOPHEN AND CAFFEINE 50; 325; 40 MG/1; MG/1; MG/1
TABLET ORAL
Qty: 30 TABLET | Refills: 3 | Status: SHIPPED | OUTPATIENT
Start: 2022-02-15 | End: 2022-08-09

## 2022-02-15 RX ORDER — ZOLPIDEM TARTRATE 10 MG/1
TABLET ORAL
Qty: 12 TABLET | Refills: 5 | Status: SHIPPED | OUTPATIENT
Start: 2022-02-15 | End: 2022-10-11

## 2022-02-15 NOTE — PROGRESS NOTES
Tiera is a 56 year old who is being evaluated via a billable video visit.    Follow-up anxiety/depression, migraines and insomnia. Doing ok emotionally. Exercise. No chest pain or shortness of breath. No more allergic reactions. No urine changes. No nausea, vomiting or diarrhea or black/bloody stool. No breast changes.   No insurance. 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 Non-smoker and normal lipids.     How would you like to obtain your AVS? MyChart  If the video visit is dropped, the invitation should be resent by: Text to cell phone: [phone]  Will anyone else be joining your video visit? No    Video Start Time: 2:17 PM    ASSESSMENT / PLAN:  (R05.9) Cough  Comment: chronic intermittent and stable  Plan: benzonatate (TESSALON) 100 MG capsule        Xray/allergist if worse.     Patient will look into insurance and mammogram and colo-guard    (G43.719) Intractable chronic migraine without aura and without status migrainosus  Comment: stable  Plan: butalbital-acetaminophen-caffeine (ESGIC)         -40 MG tablet        Prn. Reveiwed risks and side effects of medication      (G47.00) Insomnia, unspecified type  Comment: stable  Plan: zolpidem (AMBIEN) 10 MG tablet        Prn. If SUICIAL IDEATION OR HOMOCIDAL IDEATION OR PATRICK TO ER         Subjective   Tiera is a 56 year old who presents for the following health issues     HPI     Sleep medication refill       Review of Systems   All other ROS negative.      Objective           Vitals:  No vitals were obtained today due to virtual visit.    Physical Exam   GENERAL: Healthy, alert and no distress  EYES: Eyes grossly normal to inspection.  No discharge or erythema, or obvious scleral/conjunctival abnormalities.  RESP: No audible wheeze, cough, or visible cyanosis.  No visible retractions or increased work of breathing.    SKIN: Visible skin clear. No significant rash, abnormal pigmentation or lesions.  NEURO: Cranial nerves grossly intact.  Mentation and  speech appropriate for age.  PSYCH: Mentation appears normal, affect normal/bright, judgement and insight intact, normal speech and appearance well-groomed.          Video-Visit Details    Type of service:  Video Visit    Video End Time:2:28 PM    Originating Location (pt. Location): Home    Distant Location (provider location):  [facility]     Platform used for Video Visit: Mono

## 2022-02-24 ENCOUNTER — ANCILLARY PROCEDURE (OUTPATIENT)
Dept: GENERAL RADIOLOGY | Facility: CLINIC | Age: 57
End: 2022-02-24
Attending: EMERGENCY MEDICINE
Payer: COMMERCIAL

## 2022-02-24 ENCOUNTER — OFFICE VISIT (OUTPATIENT)
Dept: URGENT CARE | Facility: URGENT CARE | Age: 57
End: 2022-02-24
Payer: COMMERCIAL

## 2022-02-24 ENCOUNTER — TELEPHONE (OUTPATIENT)
Dept: URGENT CARE | Facility: URGENT CARE | Age: 57
End: 2022-02-24

## 2022-02-24 VITALS
SYSTOLIC BLOOD PRESSURE: 124 MMHG | DIASTOLIC BLOOD PRESSURE: 87 MMHG | BODY MASS INDEX: 31.1 KG/M2 | WEIGHT: 164.6 LBS | OXYGEN SATURATION: 97 % | TEMPERATURE: 98.4 F | HEART RATE: 88 BPM

## 2022-02-24 DIAGNOSIS — M54.42 ACUTE MIDLINE LOW BACK PAIN WITH LEFT-SIDED SCIATICA: ICD-10-CM

## 2022-02-24 DIAGNOSIS — M54.42 ACUTE MIDLINE LOW BACK PAIN WITH LEFT-SIDED SCIATICA: Primary | ICD-10-CM
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
## 2022-02-24 PROCEDURE — 96372 THER/PROPH/DIAG INJ SC/IM: CPT | Performed by: EMERGENCY MEDICINE

## 2022-02-24 PROCEDURE — 73502 X-RAY EXAM HIP UNI 2-3 VIEWS: CPT | Performed by: RADIOLOGY

## 2022-02-24 PROCEDURE — 72100 X-RAY EXAM L-S SPINE 2/3 VWS: CPT | Performed by: RADIOLOGY

## 2022-02-24 PROCEDURE — 99213 OFFICE O/P EST LOW 20 MIN: CPT | Mod: 25 | Performed by: EMERGENCY MEDICINE

## 2022-02-24 RX ORDER — LIDOCAINE 4 G/G
1 PATCH TOPICAL EVERY 24 HOURS
Qty: 10 PATCH | Refills: 0 | Status: SHIPPED | OUTPATIENT
Start: 2022-02-24 | End: 2024-07-08

## 2022-02-24 RX ORDER — KETOROLAC TROMETHAMINE 30 MG/ML
15 INJECTION, SOLUTION INTRAMUSCULAR; INTRAVENOUS ONCE
Status: COMPLETED | OUTPATIENT
Start: 2022-02-24 | End: 2022-02-24

## 2022-02-24 RX ORDER — CYCLOBENZAPRINE HCL 10 MG
10 TABLET ORAL 3 TIMES DAILY PRN
Qty: 21 TABLET | Refills: 0 | Status: SHIPPED | OUTPATIENT
Start: 2022-02-24 | End: 2022-03-18

## 2022-02-24 RX ADMIN — KETOROLAC TROMETHAMINE 15 MG: 30 INJECTION, SOLUTION INTRAMUSCULAR; INTRAVENOUS at 11:59

## 2022-02-24 NOTE — TELEPHONE ENCOUNTER
This patient was seen in urgent care today and she forgot to ask the provider if physical therapy, a chiropractor or nerve gliding therapy, that is supposed to help with sciatica nerve pain, would be beneficial and if so can the provider recommend anyone?  Please call patient and advise. Okay to leave a voicemail message.  Thank you.  Kristi Chambers,  [facility]
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
## 2022-02-24 NOTE — PATIENT INSTRUCTIONS
"Patient Education     * Sciatica     Sciatica (\"Lumbar Radiculopathy\") causes a pain that spreads from the lower back down into the buttock, hip and leg. Sometimes leg pain can occur without any back pain. Sciatica is due to irritation or pressure on a spinal nerve as it comes out of the spinal canal. This is most often due to pressure on the nerve from a nearby spinal disk (the cartilage cushion between each spinal bone). Other causes include spinal stenosis (narrowing of the spinal canal) and spasm of the piriformis muscle (a muscle in the buttocks that the sciatic nerve passes through).  Sciatica may begin after a sudden twisting/bending force (such as in a car accident), or sometimes after a simple awkward movement. In either case, muscle spasm is commonly present and can add to the pain.  The diagnosis of sciatica is made from the symptoms and physical exam. Unless you had a physical injury (such as a car accident or fall), X-rays are usually not ordered for the initial evaluation of sciatica because the nerves and disks cannot be seen on an X-ray. Most sciatica (80-90%) gets better with time.  What can I do about my low back pain?  There are three main things you can do to ease low back pain and help it go away.    Stay active! Use positions, movements and exercises. Too much rest can make your symptoms worse.    Use heat or cold packs.    Take medicine as directed.  Using positions, movements and exercises  Research tells us that moving your joints and muscles can help you recover from back pain. Such activity should be simple and gentle.  Use walking to help relieve your discomfort. Try taking a short walk every 3 to 4 hours during the day. Walk for a few minutes inside your home or take longer walks outside, on a treadmill or at a mall. Slowly increase the amount of time you walk. Expect discomfort when you begin, but it should lessen as your back starts to recover.  Finding a position that is " comfortable  When your back pain is new, you may find that certain positions will ease your pain. Gently try each of the following positions until you find one that eases your pain. Once you find a position of comfort, use it as often as you like while you recover. Return to your daily routine as soon as possible.    Lie on your back with your legs bent. You can do this by placing a pillow under your knees or lie on the floor and rest your lower legs on the seat of a chair.    Lie on your side with your knees bent and place a pillow between your knees.    Lie on your stomach over pillows.  Using heat or cold packs  Try cold packs or gentle heat to ease your pain. Use whichever gives you the most relief. Apply the cold pack or heat for 15 minutes at a time, as often as needed.  Taking medicine  If taking over-the-counter medicine:    Take ibuprofen (Advil, Motrin) 600 mg. three times a day as needed for pain.  OR    Take Aleve (naproxen sodium) 220 to 440 mg. two times a day as needed for pain.  If your doctor prescribed medicine, follow the instructions. Stop taking the medicine as soon as you can.  When should I call my doctor?  Your back pain should improve over the first couple of weeks. As it improves, you should be able to return to your normal activities. But call your doctor if:    You have a sudden change in your ability to control?your bladder or bowels.    You begin to feel tingling in your groin or legs.    The pain spreads down your leg and into your foot.    Your toes, feet or leg muscles begin to feel weak.    You feel generally unwell or sick.    Your pain gets worse.    GO TO THE ER WITH NEW NUMBNESS OR WEAKNESS IN THE LEGS, DIFFICULTY OR INCONTINENCE OF BOWEL OR BLADDER, NUMBNESS IN YOUR GROIN OR OTHER CONCERNS.  For informational purposes only. Not to replace the advice of your health care provider.  Copyright   2018 [employer]. All rights reserved.

## 2022-02-26 ENCOUNTER — MYC MEDICAL ADVICE (OUTPATIENT)
Dept: FAMILY MEDICINE | Facility: CLINIC | Age: 57
End: 2022-02-26

## 2022-02-28 ENCOUNTER — OFFICE VISIT (OUTPATIENT)
Dept: FAMILY MEDICINE | Facility: CLINIC | Age: 57
End: 2022-02-28
Payer: COMMERCIAL

## 2022-02-28 VITALS
OXYGEN SATURATION: 98 % | WEIGHT: 164.6 LBS | SYSTOLIC BLOOD PRESSURE: 125 MMHG | HEART RATE: 127 BPM | TEMPERATURE: 98.4 F | DIASTOLIC BLOOD PRESSURE: 79 MMHG | BODY MASS INDEX: 31.1 KG/M2

## 2022-02-28 DIAGNOSIS — M54.16 LUMBAR RADICULOPATHY: Primary | ICD-10-CM

## 2022-02-28 PROCEDURE — 99214 OFFICE O/P EST MOD 30 MIN: CPT | Performed by: FAMILY MEDICINE

## 2022-02-28 RX ORDER — PREDNISONE 20 MG/1
TABLET ORAL
Qty: 7 TABLET | Refills: 1 | Status: SHIPPED | OUTPATIENT
Start: 2022-02-28 | End: 2022-03-29

## 2022-02-28 RX ORDER — METHOCARBAMOL 500 MG/1
TABLET, FILM COATED ORAL
Qty: 30 TABLET | Refills: 1 | Status: SHIPPED | OUTPATIENT
Start: 2022-02-28 | End: 2022-10-10

## 2022-02-28 ASSESSMENT — PAIN SCALES - GENERAL: PAINLEVEL: SEVERE PAIN (6)
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
## 2022-02-28 NOTE — TELEPHONE ENCOUNTER
Pt advised appointment needed to cover all her questions and work note.  Pt agrees to plan. Appointment made.  Olya OCONNELLN, RN
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
## 2022-03-03 ENCOUNTER — TELEPHONE (OUTPATIENT)
Dept: FAMILY MEDICINE | Facility: CLINIC | Age: 57
End: 2022-03-03

## 2022-03-03 ENCOUNTER — THERAPY VISIT (OUTPATIENT)
Dept: PHYSICAL THERAPY | Facility: CLINIC | Age: 57
End: 2022-03-03
Attending: FAMILY MEDICINE
Payer: COMMERCIAL

## 2022-03-03 DIAGNOSIS — M54.16 LUMBAR RADICULOPATHY: Primary | ICD-10-CM

## 2022-03-03 PROCEDURE — 97110 THERAPEUTIC EXERCISES: CPT | Mod: GP | Performed by: PHYSICAL THERAPIST

## 2022-03-03 PROCEDURE — 97161 PT EVAL LOW COMPLEX 20 MIN: CPT | Mod: GP | Performed by: PHYSICAL THERAPIST

## 2022-03-03 PROCEDURE — 97530 THERAPEUTIC ACTIVITIES: CPT | Mod: GP | Performed by: PHYSICAL THERAPIST

## 2022-03-03 NOTE — TELEPHONE ENCOUNTER
Patient Quality Outreach    Patient is due for the following:   Colon Cancer Screening -    Breast Cancer Screening - Mammogram  Cervical Cancer Screening - PAP Needed  Physical   Immunizations  -      NEXT STEPS:   Schedule a office visit for colon cancer screen, breast cancer screen, physical with pap smear and inmunizations    Type of outreach:    Sent [employer] XM message.      Questions for provider review:    none     Vika Mcconnell
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
## 2022-03-07 ENCOUNTER — OFFICE VISIT (OUTPATIENT)
Dept: FAMILY MEDICINE | Facility: CLINIC | Age: 57
End: 2022-03-07
Payer: COMMERCIAL

## 2022-03-07 VITALS
BODY MASS INDEX: 31.84 KG/M2 | HEART RATE: 122 BPM | TEMPERATURE: 97.6 F | HEIGHT: 60 IN | SYSTOLIC BLOOD PRESSURE: 115 MMHG | OXYGEN SATURATION: 98 % | DIASTOLIC BLOOD PRESSURE: 79 MMHG | WEIGHT: 162.2 LBS

## 2022-03-07 DIAGNOSIS — M54.16 LUMBAR RADICULOPATHY: Primary | ICD-10-CM

## 2022-03-07 PROCEDURE — 99214 OFFICE O/P EST MOD 30 MIN: CPT | Performed by: FAMILY MEDICINE
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
## 2022-03-07 NOTE — PROGRESS NOTES
I spoke to [facility] and they scheduled the patient for an MRI on 3/9/22 in [place].  I called and spoke to the patient and let her know.  Kristi Chambers,  [facility]

## 2022-03-09 ENCOUNTER — ANCILLARY PROCEDURE (OUTPATIENT)
Dept: MRI IMAGING | Facility: CLINIC | Age: 57
End: 2022-03-09
Attending: FAMILY MEDICINE
Payer: COMMERCIAL

## 2022-03-09 DIAGNOSIS — M54.16 LUMBAR RADICULOPATHY: ICD-10-CM

## 2022-03-09 PROCEDURE — 72148 MRI LUMBAR SPINE W/O DYE: CPT | Mod: TC | Performed by: RADIOLOGY

## 2022-03-10 ENCOUNTER — THERAPY VISIT (OUTPATIENT)
Dept: PHYSICAL THERAPY | Facility: CLINIC | Age: 57
End: 2022-03-10
Payer: COMMERCIAL

## 2022-03-10 DIAGNOSIS — M54.16 LUMBAR RADICULOPATHY: ICD-10-CM

## 2022-03-10 PROCEDURE — 97110 THERAPEUTIC EXERCISES: CPT | Mod: GP | Performed by: PHYSICAL THERAPIST

## 2022-03-10 PROCEDURE — 97530 THERAPEUTIC ACTIVITIES: CPT | Mod: GP | Performed by: PHYSICAL THERAPIST

## 2022-03-13 ENCOUNTER — HEALTH MAINTENANCE LETTER (OUTPATIENT)
Age: 57
End: 2022-03-13

## 2022-03-17 ENCOUNTER — THERAPY VISIT (OUTPATIENT)
Dept: PHYSICAL THERAPY | Facility: CLINIC | Age: 57
End: 2022-03-17
Payer: COMMERCIAL

## 2022-03-17 DIAGNOSIS — M54.16 LUMBAR RADICULOPATHY: ICD-10-CM

## 2022-03-17 PROCEDURE — 97110 THERAPEUTIC EXERCISES: CPT | Mod: GP | Performed by: PHYSICAL THERAPIST

## 2022-03-17 PROCEDURE — 97530 THERAPEUTIC ACTIVITIES: CPT | Mod: GP | Performed by: PHYSICAL THERAPIST

## 2022-03-18 ENCOUNTER — MYC MEDICAL ADVICE (OUTPATIENT)
Dept: FAMILY MEDICINE | Facility: CLINIC | Age: 57
End: 2022-03-18

## 2022-03-18 DIAGNOSIS — M54.42 ACUTE MIDLINE LOW BACK PAIN WITH LEFT-SIDED SCIATICA: ICD-10-CM

## 2022-03-18 RX ORDER — CYCLOBENZAPRINE HCL 10 MG
10 TABLET ORAL 3 TIMES DAILY PRN
Qty: 21 TABLET | Refills: 1 | Status: SHIPPED | OUTPATIENT
Start: 2022-03-18 | End: 2024-07-08

## 2022-03-18 NOTE — TELEPHONE ENCOUNTER
Pt also requesting pain pills, see note.    Routing refill request to provider for review/approval because:  Drug not on the [facility] refill protocol   Olya Vera BSN, RN

## 2022-03-22 ENCOUNTER — MYC MEDICAL ADVICE (OUTPATIENT)
Dept: FAMILY MEDICINE | Facility: CLINIC | Age: 57
End: 2022-03-22

## 2022-03-22 DIAGNOSIS — M54.42 ACUTE MIDLINE LOW BACK PAIN WITH LEFT-SIDED SCIATICA: ICD-10-CM

## 2022-03-22 NOTE — TELEPHONE ENCOUNTER
Patient replied to our [employer] message and will print the letter from home.  I shredded the signed copy.    Ama Davenport     GUERA [facility] Augie Jensen
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
## 2022-03-22 NOTE — TELEPHONE ENCOUNTER
Please notify patient of new note until 3/30 and referral for back specialist- please give #. Pedro Vela MD

## 2022-03-24 ENCOUNTER — THERAPY VISIT (OUTPATIENT)
Dept: PHYSICAL THERAPY | Facility: CLINIC | Age: 57
End: 2022-03-24
Payer: COMMERCIAL

## 2022-03-24 DIAGNOSIS — M54.16 LUMBAR RADICULOPATHY: ICD-10-CM

## 2022-03-24 PROCEDURE — 97530 THERAPEUTIC ACTIVITIES: CPT | Mod: GP | Performed by: PHYSICAL THERAPIST

## 2022-03-24 PROCEDURE — 97110 THERAPEUTIC EXERCISES: CPT | Mod: GP | Performed by: PHYSICAL THERAPIST

## 2022-03-29 ENCOUNTER — OFFICE VISIT (OUTPATIENT)
Dept: FAMILY MEDICINE | Facility: CLINIC | Age: 57
End: 2022-03-29
Payer: COMMERCIAL

## 2022-03-29 ENCOUNTER — THERAPY VISIT (OUTPATIENT)
Dept: PHYSICAL THERAPY | Facility: CLINIC | Age: 57
End: 2022-03-29
Payer: COMMERCIAL

## 2022-03-29 VITALS
DIASTOLIC BLOOD PRESSURE: 80 MMHG | SYSTOLIC BLOOD PRESSURE: 116 MMHG | TEMPERATURE: 97.5 F | BODY MASS INDEX: 31.42 KG/M2 | HEART RATE: 121 BPM | OXYGEN SATURATION: 97 % | WEIGHT: 162.2 LBS

## 2022-03-29 DIAGNOSIS — M54.16 LUMBAR RADICULOPATHY: ICD-10-CM

## 2022-03-29 DIAGNOSIS — M54.42 ACUTE MIDLINE LOW BACK PAIN WITH LEFT-SIDED SCIATICA: ICD-10-CM

## 2022-03-29 PROCEDURE — 97530 THERAPEUTIC ACTIVITIES: CPT | Mod: GP | Performed by: PHYSICAL THERAPIST

## 2022-03-29 PROCEDURE — 99213 OFFICE O/P EST LOW 20 MIN: CPT | Performed by: FAMILY MEDICINE

## 2022-03-29 PROCEDURE — 97110 THERAPEUTIC EXERCISES: CPT | Mod: GP | Performed by: PHYSICAL THERAPIST

## 2022-03-29 ASSESSMENT — PATIENT HEALTH QUESTIONNAIRE - PHQ9
SUM OF ALL RESPONSES TO PHQ QUESTIONS 1-9: 5
10. IF YOU CHECKED OFF ANY PROBLEMS, HOW DIFFICULT HAVE THESE PROBLEMS MADE IT FOR YOU TO DO YOUR WORK, TAKE CARE OF THINGS AT HOME, OR GET ALONG WITH OTHER PEOPLE: SOMEWHAT DIFFICULT
SUM OF ALL RESPONSES TO PHQ QUESTIONS 1-9: 5

## 2022-03-29 NOTE — LETTER
[facility]  [address] [address]  Phone: [phone]    March 29, 2022        Mag Salcedo  [address]   [place] [address]          To whom it may concern:    RE: Mag Salcedo    Patient was seen and treated today at our clinic and missed work until 4/19/2022 because of back/leg pain and weakness. Patient will be seeing a back specialist on 4/18/2022.    Please contact me for questions or concerns.      Sincerely,        Pedro Vela MD
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
## 2022-03-30 ASSESSMENT — PATIENT HEALTH QUESTIONNAIRE - PHQ9: SUM OF ALL RESPONSES TO PHQ QUESTIONS 1-9: 5

## 2022-03-31 ENCOUNTER — THERAPY VISIT (OUTPATIENT)
Dept: PHYSICAL THERAPY | Facility: CLINIC | Age: 57
End: 2022-03-31
Payer: COMMERCIAL

## 2022-03-31 DIAGNOSIS — M54.16 LUMBAR RADICULOPATHY: ICD-10-CM

## 2022-03-31 PROCEDURE — 97110 THERAPEUTIC EXERCISES: CPT | Mod: GP | Performed by: PHYSICAL THERAPIST

## 2022-03-31 PROCEDURE — 97530 THERAPEUTIC ACTIVITIES: CPT | Mod: GP | Performed by: PHYSICAL THERAPIST

## 2022-04-05 ENCOUNTER — THERAPY VISIT (OUTPATIENT)
Dept: PHYSICAL THERAPY | Facility: CLINIC | Age: 57
End: 2022-04-05
Payer: COMMERCIAL

## 2022-04-05 DIAGNOSIS — M54.16 LUMBAR RADICULOPATHY: ICD-10-CM

## 2022-04-05 PROCEDURE — 97110 THERAPEUTIC EXERCISES: CPT | Mod: GP | Performed by: PHYSICAL THERAPIST

## 2022-04-05 PROCEDURE — 97530 THERAPEUTIC ACTIVITIES: CPT | Mod: GP | Performed by: PHYSICAL THERAPIST

## 2022-04-12 ENCOUNTER — THERAPY VISIT (OUTPATIENT)
Dept: PHYSICAL THERAPY | Facility: CLINIC | Age: 57
End: 2022-04-12
Payer: COMMERCIAL

## 2022-04-12 DIAGNOSIS — M54.16 LUMBAR RADICULOPATHY: Primary | ICD-10-CM

## 2022-04-12 PROCEDURE — 97140 MANUAL THERAPY 1/> REGIONS: CPT | Mod: GP | Performed by: PHYSICAL THERAPIST

## 2022-04-12 PROCEDURE — 97110 THERAPEUTIC EXERCISES: CPT | Mod: GP | Performed by: PHYSICAL THERAPIST

## 2022-04-12 NOTE — PROGRESS NOTES
Assessment/Plan:    PROGRESS  REPORT    Progress reporting period is from 3/29/2022 to 4/12/2022.   Pt has participated with 8 PT sessions.    SUBJECTIVE  Subjective changes noted by patient:  .  Subjective: Pt reports she is doing much better but for some reason it is flared up today. pt reports she is relieved to see a new family practice doctor and also relieved that she is seeing a spine specialist next week. pt reports that she has been faithful with her exercises, also meditating more to calm anxiety. pt reports only pain in the central low back, no pain radiating into her legs anymore and is happy with that progress. pt reports she notes she takes walks and can sit for short periods of time whereas before she could not do this.     Current Pain level: 7/10 in the back (4-5/10 after participate with PT today).      Initial Pain level: 8/10 and into B legs.   Changes in function:  Yes (See Goal flowsheet attached for changes in current functional level)  Adverse reaction to treatment or activity: None    OBJECTIVE  Changes noted in objective findings:  Yes, improved pain, ROM and function  Objective: Lumbar AROM: mod loss extension with cues to push into motion, pt guarding observed today however could reduce guarding to push further into motion. pt demo normal gait pattern, no guarding posture with gait. pt able to sit in a normal height chair in clinic today without increased pain for a few minutes.     ASSESSMENT/PLAN  Updated problem list and treatment plan: Diagnosis 1:  Low back Pain  Pain -  manual therapy, self management, education and home program  Decreased ROM/flexibility - manual therapy and therapeutic exercise  Decreased joint mobility - manual therapy and therapeutic exercise  Decreased function - therapeutic activities  STG/LTGs have been met or progress has been made towards goals:  Yes (See Goal flow sheet completed today.)  Assessment of Progress: The patient's condition is  improving.  Self Management Plans:  Patient has been instructed in a home treatment program.  I have re-evaluated this patient and find that the nature, scope, duration and intensity of the therapy is appropriate for the medical condition of the patient.  Mag continues to require the following intervention to meet STG and LTG's:  PT    Recommendations:  This patient would benefit from continued therapy.     Frequency:  1 X week, once daily  Duration:  for 6 weeks        Please refer to the daily flowsheet for treatment today, total treatment time and time spent performing 1:1 timed codes.

## 2022-04-19 ENCOUNTER — THERAPY VISIT (OUTPATIENT)
Dept: PHYSICAL THERAPY | Facility: CLINIC | Age: 57
End: 2022-04-19
Payer: COMMERCIAL

## 2022-04-19 ENCOUNTER — OFFICE VISIT (OUTPATIENT)
Dept: FAMILY MEDICINE | Facility: CLINIC | Age: 57
End: 2022-04-19
Payer: COMMERCIAL

## 2022-04-19 VITALS
TEMPERATURE: 98.7 F | HEART RATE: 72 BPM | DIASTOLIC BLOOD PRESSURE: 80 MMHG | SYSTOLIC BLOOD PRESSURE: 124 MMHG | BODY MASS INDEX: 31.8 KG/M2 | HEIGHT: 60 IN | WEIGHT: 162 LBS | RESPIRATION RATE: 16 BRPM

## 2022-04-19 DIAGNOSIS — M54.16 LUMBAR RADICULOPATHY: Primary | ICD-10-CM

## 2022-04-19 PROCEDURE — 97110 THERAPEUTIC EXERCISES: CPT | Mod: GP | Performed by: PHYSICAL THERAPIST

## 2022-04-19 PROCEDURE — 99213 OFFICE O/P EST LOW 20 MIN: CPT | Performed by: NURSE PRACTITIONER

## 2022-04-19 PROCEDURE — 97530 THERAPEUTIC ACTIVITIES: CPT | Mod: GP | Performed by: PHYSICAL THERAPIST

## 2022-04-19 ASSESSMENT — ENCOUNTER SYMPTOMS: BACK PAIN: 1

## 2022-04-19 ASSESSMENT — PAIN SCALES - GENERAL: PAINLEVEL: MODERATE PAIN (5)
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
## 2022-04-19 NOTE — LETTER
[facility]  [address] [place] [address]  Phone: [phone]  Fax: [phone]    April 19, 2022        Mag Salcedo  [address]   [place] [address]          To whom it may concern:    RE: Mag Salcedo    Patient was seen and treated today at our clinic and missed work. until 4/25/2022 because of back/leg pain and weakness. Patient will be seeing a specialist on 4/25/2022.    Please contact me for questions or concerns.      Sincerely,        KYLE Freire CNP

## 2022-05-08 ENCOUNTER — HEALTH MAINTENANCE LETTER (OUTPATIENT)
Age: 57
End: 2022-05-08

## 2022-05-12 ENCOUNTER — THERAPY VISIT (OUTPATIENT)
Dept: PHYSICAL THERAPY | Facility: CLINIC | Age: 57
End: 2022-05-12
Payer: COMMERCIAL

## 2022-05-12 DIAGNOSIS — M54.16 LUMBAR RADICULOPATHY: Primary | ICD-10-CM

## 2022-05-12 PROCEDURE — 97110 THERAPEUTIC EXERCISES: CPT | Mod: GP | Performed by: PHYSICAL THERAPIST

## 2022-05-12 PROCEDURE — 97530 THERAPEUTIC ACTIVITIES: CPT | Mod: GP | Performed by: PHYSICAL THERAPIST

## 2022-05-19 ENCOUNTER — THERAPY VISIT (OUTPATIENT)
Dept: PHYSICAL THERAPY | Facility: CLINIC | Age: 57
End: 2022-05-19
Payer: COMMERCIAL

## 2022-05-19 DIAGNOSIS — M54.16 LUMBAR RADICULOPATHY: Primary | ICD-10-CM

## 2022-05-19 PROCEDURE — 97112 NEUROMUSCULAR REEDUCATION: CPT | Mod: GP | Performed by: PHYSICAL THERAPIST

## 2022-05-19 PROCEDURE — 97110 THERAPEUTIC EXERCISES: CPT | Mod: GP | Performed by: PHYSICAL THERAPIST

## 2022-07-11 ENCOUNTER — TELEPHONE (OUTPATIENT)
Dept: FAMILY MEDICINE | Facility: CLINIC | Age: 57
End: 2022-07-11

## 2022-07-11 NOTE — TELEPHONE ENCOUNTER
Patient Quality Outreach    Patient is due for the following:   Colon Cancer Screening -    Breast Cancer Screening - Mammogram  Cervical Cancer Screening - PAP Needed  Physical   Immunizations  -      NEXT STEPS:   Schedule a office visit for mammogram, colon cancer screen, immunizations pap smear with yearly physical    Type of outreach:    Sent [employer] message.      Questions for provider review:    None     Vika Mcconnell

## 2022-08-09 DIAGNOSIS — G43.719 INTRACTABLE CHRONIC MIGRAINE WITHOUT AURA AND WITHOUT STATUS MIGRAINOSUS: ICD-10-CM

## 2022-08-09 RX ORDER — BUTALBITAL, ACETAMINOPHEN AND CAFFEINE 50; 325; 40 MG/1; MG/1; MG/1
TABLET ORAL
Qty: 30 TABLET | Refills: 0 | Status: SHIPPED | OUTPATIENT
Start: 2022-08-09 | End: 2022-10-10

## 2022-08-09 NOTE — TELEPHONE ENCOUNTER
Relayed provider message. Patient will call back to schedule.    Della Cardenas, Patient Representative - [facility]

## 2022-10-10 ENCOUNTER — OFFICE VISIT (OUTPATIENT)
Dept: FAMILY MEDICINE | Facility: CLINIC | Age: 57
End: 2022-10-10

## 2022-10-10 ENCOUNTER — MYC MEDICAL ADVICE (OUTPATIENT)
Dept: FAMILY MEDICINE | Facility: CLINIC | Age: 57
End: 2022-10-10

## 2022-10-10 VITALS
HEIGHT: 60 IN | DIASTOLIC BLOOD PRESSURE: 60 MMHG | BODY MASS INDEX: 31.41 KG/M2 | SYSTOLIC BLOOD PRESSURE: 118 MMHG | TEMPERATURE: 98.1 F | HEART RATE: 84 BPM | RESPIRATION RATE: 16 BRPM | WEIGHT: 160 LBS

## 2022-10-10 DIAGNOSIS — G47.00 INSOMNIA, UNSPECIFIED TYPE: ICD-10-CM

## 2022-10-10 DIAGNOSIS — R05.9 COUGH, UNSPECIFIED TYPE: ICD-10-CM

## 2022-10-10 DIAGNOSIS — Z12.31 VISIT FOR SCREENING MAMMOGRAM: ICD-10-CM

## 2022-10-10 DIAGNOSIS — Z11.4 SCREENING FOR HIV (HUMAN IMMUNODEFICIENCY VIRUS): ICD-10-CM

## 2022-10-10 DIAGNOSIS — G47.09 OTHER INSOMNIA: ICD-10-CM

## 2022-10-10 DIAGNOSIS — Z13.220 SCREENING FOR HYPERLIPIDEMIA: ICD-10-CM

## 2022-10-10 DIAGNOSIS — Z12.11 SCREEN FOR COLON CANCER: ICD-10-CM

## 2022-10-10 DIAGNOSIS — G43.719 INTRACTABLE CHRONIC MIGRAINE WITHOUT AURA AND WITHOUT STATUS MIGRAINOSUS: ICD-10-CM

## 2022-10-10 DIAGNOSIS — Z11.59 NEED FOR HEPATITIS C SCREENING TEST: ICD-10-CM

## 2022-10-10 LAB
ANION GAP SERPL CALCULATED.3IONS-SCNC: 11 MMOL/L (ref 7–15)
BUN SERPL-MCNC: 11 MG/DL (ref 6–20)
CALCIUM SERPL-MCNC: 10.1 MG/DL (ref 8.6–10)
CHLORIDE SERPL-SCNC: 103 MMOL/L (ref 98–107)
CHOLEST SERPL-MCNC: 226 MG/DL
CREAT SERPL-MCNC: 0.84 MG/DL (ref 0.51–0.95)
DEPRECATED HCO3 PLAS-SCNC: 29 MMOL/L (ref 22–29)
ERYTHROCYTE [DISTWIDTH] IN BLOOD BY AUTOMATED COUNT: 12.1 % (ref 10–15)
GFR SERPL CREATININE-BSD FRML MDRD: 81 ML/MIN/1.73M2
GLUCOSE SERPL-MCNC: 80 MG/DL (ref 70–99)
HCT VFR BLD AUTO: 43.6 % (ref 35–47)
HDLC SERPL-MCNC: 78 MG/DL
HGB BLD-MCNC: 14.5 G/DL (ref 11.7–15.7)
LDLC SERPL CALC-MCNC: 132 MG/DL
MCH RBC QN AUTO: 29.8 PG (ref 26.5–33)
MCHC RBC AUTO-ENTMCNC: 33.3 G/DL (ref 31.5–36.5)
MCV RBC AUTO: 90 FL (ref 78–100)
NONHDLC SERPL-MCNC: 148 MG/DL
PLATELET # BLD AUTO: 328 10E3/UL (ref 150–450)
POTASSIUM SERPL-SCNC: 4.7 MMOL/L (ref 3.4–5.3)
RBC # BLD AUTO: 4.86 10E6/UL (ref 3.8–5.2)
SODIUM SERPL-SCNC: 143 MMOL/L (ref 136–145)
TRIGL SERPL-MCNC: 79 MG/DL
WBC # BLD AUTO: 5.8 10E3/UL (ref 4–11)

## 2022-10-10 PROCEDURE — 80061 LIPID PANEL: CPT | Performed by: NURSE PRACTITIONER

## 2022-10-10 PROCEDURE — 36415 COLL VENOUS BLD VENIPUNCTURE: CPT | Performed by: NURSE PRACTITIONER

## 2022-10-10 PROCEDURE — 99214 OFFICE O/P EST MOD 30 MIN: CPT | Performed by: NURSE PRACTITIONER

## 2022-10-10 PROCEDURE — 80048 BASIC METABOLIC PNL TOTAL CA: CPT | Performed by: NURSE PRACTITIONER

## 2022-10-10 PROCEDURE — 85027 COMPLETE CBC AUTOMATED: CPT | Performed by: NURSE PRACTITIONER

## 2022-10-10 RX ORDER — TRAZODONE HYDROCHLORIDE 50 MG/1
TABLET, FILM COATED ORAL
Qty: 180 TABLET | Refills: 0 | Status: SHIPPED | OUTPATIENT
Start: 2022-10-10 | End: 2024-07-08

## 2022-10-10 RX ORDER — BENZONATATE 100 MG/1
100 CAPSULE ORAL 3 TIMES DAILY PRN
Qty: 30 CAPSULE | Refills: 2 | Status: SHIPPED | OUTPATIENT
Start: 2022-10-10 | End: 2024-07-08

## 2022-10-10 RX ORDER — BUTALBITAL, ACETAMINOPHEN AND CAFFEINE 50; 325; 40 MG/1; MG/1; MG/1
TABLET ORAL
Qty: 30 TABLET | Refills: 0 | Status: SHIPPED | OUTPATIENT
Start: 2022-10-10 | End: 2024-07-08 | Stop reason: ALTCHOICE

## 2022-10-10 ASSESSMENT — PATIENT HEALTH QUESTIONNAIRE - PHQ9
SUM OF ALL RESPONSES TO PHQ QUESTIONS 1-9: 6
SUM OF ALL RESPONSES TO PHQ QUESTIONS 1-9: 6
10. IF YOU CHECKED OFF ANY PROBLEMS, HOW DIFFICULT HAVE THESE PROBLEMS MADE IT FOR YOU TO DO YOUR WORK, TAKE CARE OF THINGS AT HOME, OR GET ALONG WITH OTHER PEOPLE: SOMEWHAT DIFFICULT

## 2022-10-10 ASSESSMENT — ANXIETY QUESTIONNAIRES
GAD7 TOTAL SCORE: 11
3. WORRYING TOO MUCH ABOUT DIFFERENT THINGS: MORE THAN HALF THE DAYS
GAD7 TOTAL SCORE: 11
GAD7 TOTAL SCORE: 11
6. BECOMING EASILY ANNOYED OR IRRITABLE: SEVERAL DAYS
5. BEING SO RESTLESS THAT IT IS HARD TO SIT STILL: MORE THAN HALF THE DAYS
7. FEELING AFRAID AS IF SOMETHING AWFUL MIGHT HAPPEN: NOT AT ALL
4. TROUBLE RELAXING: MORE THAN HALF THE DAYS
2. NOT BEING ABLE TO STOP OR CONTROL WORRYING: MORE THAN HALF THE DAYS
1. FEELING NERVOUS, ANXIOUS, OR ON EDGE: MORE THAN HALF THE DAYS
8. IF YOU CHECKED OFF ANY PROBLEMS, HOW DIFFICULT HAVE THESE MADE IT FOR YOU TO DO YOUR WORK, TAKE CARE OF THINGS AT HOME, OR GET ALONG WITH OTHER PEOPLE?: SOMEWHAT DIFFICULT
7. FEELING AFRAID AS IF SOMETHING AWFUL MIGHT HAPPEN: NOT AT ALL
IF YOU CHECKED OFF ANY PROBLEMS ON THIS QUESTIONNAIRE, HOW DIFFICULT HAVE THESE PROBLEMS MADE IT FOR YOU TO DO YOUR WORK, TAKE CARE OF THINGS AT HOME, OR GET ALONG WITH OTHER PEOPLE: SOMEWHAT DIFFICULT

## 2022-10-10 ASSESSMENT — PAIN SCALES - GENERAL: PAINLEVEL: NO PAIN (0)

## 2022-10-10 NOTE — PROGRESS NOTES
"  Assessment & Plan     (G43.719) Intractable chronic migraine without aura and without status migrainosus  Comment: uncontrolled will attempt Maxalt and amitriptyline for preventive    Plan: butalbital-acetaminophen-caffeine (ESGIC)         -40 MG tablet, amitriptyline (ELAVIL) 25         MG tablet, CBC with platelets, Basic metabolic         panel  (Ca, Cl, CO2, Creat, Gluc, K, Na, BUN)      (G47.09) Other insomnia  Comment:  Controlled no change in treatment plan  Plan: traZODone (DESYREL) 50 MG tablet      (Z11.59) Need for hepatitis C screening test  Comment: Reviewed declined removed from chart   Plan:     (Z12.31) Visit for screening mammogram  Comment:   Plan: MA SCREENING DIGITAL BILAT - Future  (s+30)            (Z13.220) Screening for hyperlipidemia  Comment:   Plan: Lipid panel reflex to direct LDL Non-fasting       (Z12.11) Screen for colon cancer  Comment:   Plan: COLOGUARD([employer])            (R05.9) Cough, unspecified type  Comment:  Controlled no change in treatment plan  Plan: benzonatate (TESSALON) 100 MG capsule     (Z11.4) Screening for HIV (human immunodeficiency virus)  Comment: declined removed from chart   Plan:    BMI:   Estimated body mass index is 30.99 kg/m  as calculated from the following:    Height as of this encounter: 1.53 m (5' 0.25\").    Weight as of this encounter: 72.6 kg (160 lb).   Weight management plan: Discussed healthy diet and exercise guidelines        No follow-ups on file.    KYLE Freire CNP  [facility]    Fiorella Mott is a 56 year old, presenting for the following health issues:  No chief complaint on file.      HPI     Migraine     Since your last clinic visit, how have your headaches changed?  No change    How often are you getting headaches or migraines? 3-5 days a week     Are you able to do normal daily activities when you have a migraine? No    Are you taking rescue/relief medications? (Select all that " "apply) Tylenol, Esgic    How helpful is your rescue/relief medication?  I get total relief    Are you taking any medications to prevent migraines? (Select all that apply)  No    In the past 4 weeks, how often have you gone to urgent care or the emergency room because of your headaches?  0    Review of Systems   Constitutional, HEENT, cardiovascular, pulmonary, gi and gu systems are negative, except as otherwise noted.      Objective    LMP 11/05/2010   Body mass index is 30.99 kg/m . /60 (BP Location: Right arm, Cuff Size: Adult Regular)   Pulse 84   Temp 98.1  F (36.7  C) (Tympanic)   Resp 16   Ht 1.53 m (5' 0.25\")   Wt 72.6 kg (160 lb)   LMP 11/05/2010   BMI 30.99 kg/m      Physical Exam   GENERAL: healthy, alert and no distress  EYES: Eyes grossly normal to inspection, PERRL and conjunctivae and sclerae normal  HENT: ear canals and TM's normal, nose and mouth without ulcers or lesions  NECK: no adenopathy, no asymmetry, masses, or scars and thyroid normal to palpation  RESP: lungs clear to auscultation - no rales, rhonchi or wheezes  CV: regular rate and rhythm, normal S1 S2, no S3 or S4, no murmur, click or rub, no peripheral edema and peripheral pulses strong  ABDOMEN: soft, nontender, no hepatosplenomegaly, no masses and bowel sounds normal  MS: no gross musculoskeletal defects noted, no edema  SKIN: no suspicious lesions or rashes  NEURO: Normal strength and tone, mentation intact and speech normal  PSYCH: mentation appears normal, affect normal/bright    Results for orders placed or performed in visit on 10/10/22   CBC with platelets     Status: Normal   Result Value Ref Range    WBC Count 5.8 4.0 - 11.0 10e3/uL    RBC Count 4.86 3.80 - 5.20 10e6/uL    Hemoglobin 14.5 11.7 - 15.7 g/dL    Hematocrit 43.6 35.0 - 47.0 %    MCV 90 78 - 100 fL    MCH 29.8 26.5 - 33.0 pg    MCHC 33.3 31.5 - 36.5 g/dL    RDW 12.1 10.0 - 15.0 %    Platelet Count 328 150 - 450 10e3/uL               "

## 2022-10-11 RX ORDER — ZOLPIDEM TARTRATE 10 MG/1
TABLET ORAL
Qty: 12 TABLET | Refills: 5 | Status: SHIPPED | OUTPATIENT
Start: 2022-10-11 | End: 2024-07-08

## 2022-11-14 DIAGNOSIS — G43.719 INTRACTABLE CHRONIC MIGRAINE WITHOUT AURA AND WITHOUT STATUS MIGRAINOSUS: ICD-10-CM

## 2022-11-15 RX ORDER — BUTALBITAL, ACETAMINOPHEN AND CAFFEINE 50; 325; 40 MG/1; MG/1; MG/1
TABLET ORAL
Qty: 30 TABLET | Refills: 0 | OUTPATIENT
Start: 2022-11-15

## 2022-11-15 NOTE — TELEPHONE ENCOUNTER
If patient is using this amount of medicitons in 1 month will need an appointment for further refills as underlying headaches or not controlled.

## 2022-11-15 NOTE — TELEPHONE ENCOUNTER
Requested Prescriptions   Pending Prescriptions Disp Refills    butalbital-acetaminophen-caffeine (ESGIC) -40 MG tablet [Pharmacy Med Name: BUTALBITAL-APAP-CAFF -40 TABS] 30 tablet 0     Sig: TAKE ONE TABLET BY MOUTH EVERY 6 HOURS AS NEEDED FOR HEADACHE DO NOT EXCEED 4000MG ACETAMINOPHEN/DAY       There is no refill protocol information for this order

## 2022-11-16 NOTE — TELEPHONE ENCOUNTER
Pt called back. Informed her that she would need an appt before refills can be placed. Pt stated she will call back to schedule appt.    Cheri Grigsby Patient

## 2023-01-08 ENCOUNTER — HEALTH MAINTENANCE LETTER (OUTPATIENT)
Age: 58
End: 2023-01-08

## 2023-01-09 ENCOUNTER — TELEPHONE (OUTPATIENT)
Dept: FAMILY MEDICINE | Facility: CLINIC | Age: 58
End: 2023-01-09

## 2023-01-09 NOTE — TELEPHONE ENCOUNTER
Patient Quality Outreach    Patient is due for the following:   Colon Cancer Screening  Breast Cancer Screening - Mammogram  Cervical Cancer Screening - PAP Needed  Physical Preventive Adult Physical    Next Steps:   Schedule a office visit for mammogram, colon cancer screen pap smear and an Adult Preventative    Type of outreach:    Sent [employer] message.      Questions for provider review:    None     Vkia Mcconnell

## 2023-02-01 ENCOUNTER — VIRTUAL VISIT (OUTPATIENT)
Dept: FAMILY MEDICINE | Facility: CLINIC | Age: 58
End: 2023-02-01

## 2023-02-01 ENCOUNTER — TELEPHONE (OUTPATIENT)
Dept: FAMILY MEDICINE | Facility: CLINIC | Age: 58
End: 2023-02-01

## 2023-02-01 DIAGNOSIS — R13.12 OROPHARYNGEAL DYSPHAGIA: ICD-10-CM

## 2023-02-01 DIAGNOSIS — R11.2 NAUSEA AND VOMITING, UNSPECIFIED VOMITING TYPE: ICD-10-CM

## 2023-02-01 DIAGNOSIS — J02.9 SORE THROAT: ICD-10-CM

## 2023-02-01 DIAGNOSIS — Z20.818 STREPTOCOCCUS EXPOSURE: ICD-10-CM

## 2023-02-01 DIAGNOSIS — U07.1 INFECTION DUE TO 2019 NOVEL CORONAVIRUS: Primary | ICD-10-CM

## 2023-02-01 PROCEDURE — 99213 OFFICE O/P EST LOW 20 MIN: CPT | Mod: 95 | Performed by: NURSE PRACTITIONER

## 2023-02-01 RX ORDER — AMOXICILLIN 400 MG/5ML
500 POWDER, FOR SUSPENSION ORAL 2 TIMES DAILY
Qty: 125 ML | Refills: 0 | Status: SHIPPED | OUTPATIENT
Start: 2023-02-01 | End: 2023-02-11

## 2023-02-01 ASSESSMENT — PATIENT HEALTH QUESTIONNAIRE - PHQ9
10. IF YOU CHECKED OFF ANY PROBLEMS, HOW DIFFICULT HAVE THESE PROBLEMS MADE IT FOR YOU TO DO YOUR WORK, TAKE CARE OF THINGS AT HOME, OR GET ALONG WITH OTHER PEOPLE: SOMEWHAT DIFFICULT
SUM OF ALL RESPONSES TO PHQ QUESTIONS 1-9: 4
SUM OF ALL RESPONSES TO PHQ QUESTIONS 1-9: 4

## 2023-02-01 NOTE — TELEPHONE ENCOUNTER
Spoke with patient to review RN COVID19 protocol which patient would qualify for but patient is also wanting treatment for possible strep throat as she was exposed to strep by her grandchildren on Saturday.    Informed patient this would require an e-visit or virtual visit if she is wanting to pursue treatment for both, she agreed and was transferred to the appointment desk to schedule.    Julie Behrendt RN

## 2023-02-01 NOTE — TELEPHONE ENCOUNTER
COVID Positive/Requesting COVID treatment    Patient is positive for COVID and requesting treatment options.    Date of positive COVID test (PCR or at home)? Yesterday, at home test  Current COVID symptoms: fever or chills, cough, muscle or body aches, headache, sore throat, congestion or runny nose and nausea or vomiting  Date COVID symptoms began: Sunday, 1/29    Message should be routed to clinic RN pool. Best phone number to use for call back: [phone]

## 2023-02-01 NOTE — PROGRESS NOTES
Tiera is a 57 year old who is being evaluated via a billable telephone visit.      What phone number would you like to be contacted at? [phone]  How would you like to obtain your AVS? Laura  Distant Location (provider location):  Off-site    Assessment & Plan     Infection due to 2019 novel coronavirus    - nirmatrelvir and ritonavir (PAXLOVID) therapy pack; Take 3 tablets by mouth 2 times daily for 5 days    Streptococcus exposure    - amoxicillin (AMOXIL) 400 MG/5ML suspension; Take 6.25 mLs (500 mg) by mouth 2 times daily for 10 days    Sore throat    - amoxicillin (AMOXIL) 400 MG/5ML suspension; Take 6.25 mLs (500 mg) by mouth 2 times daily for 10 days    Nausea and vomiting, unspecified vomiting type    - amoxicillin (AMOXIL) 400 MG/5ML suspension; Take 6.25 mLs (500 mg) by mouth 2 times daily for 10 days    Oropharyngeal dysphagia    - amoxicillin (AMOXIL) 400 MG/5ML suspension; Take 6.25 mLs (500 mg) by mouth 2 times daily for 10 days    30 minutes spent on the date of the encounter doing chart review, history and exam, documentation and further activities per the note     See Patient Instructions: Take full course of antibiotics with daily yogurt or probiotics.  If taking paxlovid-start within the first 5 days of symptoms.  Avoid trazodone or reduce dose as it can cause increased sedation.  Mask for 10 days after positive COVID test to prevent spread of symptoms.  Review after visit summary tips.  If symptoms persist or worsen go in for in person evaluation.  Patient in agreement.    Return in about 1 week (around 2/8/2023), or if symptoms worsen or fail to improve.    RYAN ELDER, TOM  [facility] CAROLYN Mott is a 57 year old, presenting for the following health issues:  URI (Possible Covid )    She reports symptoms started Sunday she had a positive COVID test.  She has extremely sore throats, trouble swallowing, nausea and vomiting.  She was around her  Grandkids who also are positive for strep.  She does not want to come in for strep testing she would like to be treated for strep and COVID.  Discussed risks of antibiotics, take the full course with daily yogurt or probiotics to prevent antibiotic resistance and complications from antibiotics including infectious diarrhea.  She would like the liquid antibiotics sent in due to her difficulty swallowing.  She reports her symptoms are consistent with previous strep and they usually improve after 1 day of antibiotics.  Discussed need to start Paxlovid within 5 days of onset of symptoms or do not start medication as it will not be effective.  Discussed the effectiveness of paxlovid, how it works potential side effects.  Discussed that it can increase the effectiveness of her trazodone and cause sedation can withhold trazodone or decreased dosing.  Discussed masking for 10 days after positive COVID test.  If symptoms are persisting or worsening go in for in person evaluation.  Patient would like to do antiviral treatment.  Last GFR was normal she denies history of acute or chronic kidney issues.  Questions answered.  Advised to review to review after visit summary tips.  Patient in agreement.  HPI   Acute Illness  Acute illness concerns: Sunday 1/29/2023  Symptoms:  Fever: YES- feels super hot but is freezing   Chills/Sweats: YES  Headache (location?): YES  Sinus Pressure: YES  Conjunctivitis:  No  Ear Pain: no  Rhinorrhea: No  Congestion: YES  Sore Throat: YES  Cough: YES  Wheeze: No  Decreased Appetite: YES  Nausea: YES  Vomiting: YES  Diarrhea: YES  Dysuria/Freq.: No  Dysuria or Hematuria: No  Fatigue/Achiness: YES  Sick/Strep Exposure: No  Therapies tried and outcome: Tylenol     Review of Systems   Constitutional, HEENT, cardiovascular, pulmonary, GI, , musculoskeletal, neuro, skin, endocrine and psych systems are negative, except as otherwise noted.      Objective           Vitals:  No vitals were obtained today  due to virtual visit.    Physical Exam   healthy, alert and no distress  PSYCH: Alert and oriented times 3; coherent speech, normal   rate and volume, able to articulate logical thoughts, able   to abstract reason, no tangential thoughts, no hallucinations   or delusions  Her affect is normal  RESP: No cough, no audible wheezing, able to talk in full sentences  Remainder of exam unable to be completed due to telephone visits    Patient declining testing.  She had a positive COVID test at home on Sunday and she was around her grandkids who have strep and her symptoms are similar to her previous strep infections.        Phone call duration: 12 minutes    Answers for HPI/ROS submitted by the patient on 2/1/2023  If you checked off any problems, how difficult have these problems made it for you to do your work, take care of things at home, or get along with other people?: Somewhat difficult  PHQ9 TOTAL SCORE: 4

## 2023-06-02 ENCOUNTER — HEALTH MAINTENANCE LETTER (OUTPATIENT)
Age: 58
End: 2023-06-02

## 2023-07-10 ENCOUNTER — TELEPHONE (OUTPATIENT)
Dept: FAMILY MEDICINE | Facility: CLINIC | Age: 58
End: 2023-07-10

## 2023-07-10 NOTE — TELEPHONE ENCOUNTER
Patient Quality Outreach    Patient is due for the following:   Colon Cancer Screening  Breast Cancer Screening - Mammogram  Cervical Cancer Screening - PAP Needed  Physical Preventive Adult Physical      Topic Date Due     COVID-19 Vaccine (1) Never done     Zoster (Shingles) Vaccine (1 of 2) Never done     Diptheria Tetanus Pertussis (DTAP/TDAP/TD) Vaccine (2 - Td or Tdap) 09/12/2022       Next Steps:   Schedule a office visit for Mammogram, Pap smear colon cancer screen and an Adult Preventative    Type of outreach:    Sent [employer] message.      Questions for provider review:    None     Vika Mcconnell

## 2023-10-30 ENCOUNTER — TELEPHONE (OUTPATIENT)
Dept: FAMILY MEDICINE | Facility: CLINIC | Age: 58
End: 2023-10-30

## 2023-10-30 NOTE — TELEPHONE ENCOUNTER
Patient Quality Outreach    Patient is due for the following:   Colon Cancer Screening  Breast Cancer Screening - Mammogram  Cervical Cancer Screening - PAP Needed  Depression  -  PHQ-9 needed  Physical Preventive Adult Physical      Topic Date Due    COVID-19 Vaccine (1) Never done    Zoster (Shingles) Vaccine (1 of 2) Never done    Diptheria Tetanus Pertussis (DTAP/TDAP/TD) Vaccine (2 - Td or Tdap) 09/12/2022    Flu Vaccine (1) 09/01/2023       Next Steps:   Schedule a Adult Preventative  Patient was assigned appropriate questionnaire to complete    Type of outreach:    Sent [employer] message.      Questions for provider review:    None           Madhavi Hubbard MA

## 2024-04-21 ENCOUNTER — HEALTH MAINTENANCE LETTER (OUTPATIENT)
Age: 59
End: 2024-04-21

## 2024-06-17 PROBLEM — Z71.89 ADVANCED DIRECTIVES, COUNSELING/DISCUSSION: Status: RESOLVED | Noted: 2020-04-30 | Resolved: 2024-06-17

## 2024-06-30 ENCOUNTER — HEALTH MAINTENANCE LETTER (OUTPATIENT)
Age: 59
End: 2024-06-30

## 2024-07-08 ENCOUNTER — OFFICE VISIT (OUTPATIENT)
Dept: FAMILY MEDICINE | Facility: CLINIC | Age: 59
End: 2024-07-08

## 2024-07-08 VITALS
HEIGHT: 60 IN | HEART RATE: 112 BPM | DIASTOLIC BLOOD PRESSURE: 80 MMHG | TEMPERATURE: 98 F | SYSTOLIC BLOOD PRESSURE: 106 MMHG | RESPIRATION RATE: 20 BRPM | BODY MASS INDEX: 31.8 KG/M2 | WEIGHT: 162 LBS | OXYGEN SATURATION: 96 %

## 2024-07-08 DIAGNOSIS — R05.9 COUGH, UNSPECIFIED TYPE: ICD-10-CM

## 2024-07-08 DIAGNOSIS — H25.9 AGE-RELATED CATARACT OF BOTH EYES, UNSPECIFIED AGE-RELATED CATARACT TYPE: ICD-10-CM

## 2024-07-08 DIAGNOSIS — Z01.818 PRE-OP EXAM: Primary | ICD-10-CM

## 2024-07-08 DIAGNOSIS — Z12.11 SCREEN FOR COLON CANCER: ICD-10-CM

## 2024-07-08 DIAGNOSIS — Z12.31 VISIT FOR SCREENING MAMMOGRAM: ICD-10-CM

## 2024-07-08 DIAGNOSIS — Z12.4 CERVICAL CANCER SCREENING: ICD-10-CM

## 2024-07-08 LAB
ANION GAP SERPL CALCULATED.3IONS-SCNC: 10 MMOL/L (ref 7–15)
BUN SERPL-MCNC: 8.2 MG/DL (ref 8–23)
CALCIUM SERPL-MCNC: 9.9 MG/DL (ref 8.6–10)
CHLORIDE SERPL-SCNC: 101 MMOL/L (ref 98–107)
CREAT SERPL-MCNC: 0.84 MG/DL (ref 0.51–0.95)
DEPRECATED HCO3 PLAS-SCNC: 29 MMOL/L (ref 22–29)
EGFRCR SERPLBLD CKD-EPI 2021: 80 ML/MIN/1.73M2
ERYTHROCYTE [DISTWIDTH] IN BLOOD BY AUTOMATED COUNT: 12.5 % (ref 10–15)
GLUCOSE SERPL-MCNC: 86 MG/DL (ref 70–99)
HCT VFR BLD AUTO: 43.2 % (ref 35–47)
HGB BLD-MCNC: 14 G/DL (ref 11.7–15.7)
MCH RBC QN AUTO: 29 PG (ref 26.5–33)
MCHC RBC AUTO-ENTMCNC: 32.4 G/DL (ref 31.5–36.5)
MCV RBC AUTO: 90 FL (ref 78–100)
PLATELET # BLD AUTO: 317 10E3/UL (ref 150–450)
POTASSIUM SERPL-SCNC: 4.3 MMOL/L (ref 3.4–5.3)
RBC # BLD AUTO: 4.82 10E6/UL (ref 3.8–5.2)
SODIUM SERPL-SCNC: 140 MMOL/L (ref 135–145)
TSH SERPL DL<=0.005 MIU/L-ACNC: 4.02 UIU/ML (ref 0.3–4.2)
WBC # BLD AUTO: 7.8 10E3/UL (ref 4–11)

## 2024-07-08 PROCEDURE — 36415 COLL VENOUS BLD VENIPUNCTURE: CPT | Performed by: NURSE PRACTITIONER

## 2024-07-08 PROCEDURE — 99214 OFFICE O/P EST MOD 30 MIN: CPT | Performed by: NURSE PRACTITIONER

## 2024-07-08 PROCEDURE — 80048 BASIC METABOLIC PNL TOTAL CA: CPT | Performed by: NURSE PRACTITIONER

## 2024-07-08 PROCEDURE — 84443 ASSAY THYROID STIM HORMONE: CPT | Performed by: NURSE PRACTITIONER

## 2024-07-08 PROCEDURE — 85027 COMPLETE CBC AUTOMATED: CPT | Performed by: NURSE PRACTITIONER

## 2024-07-08 RX ORDER — BENZONATATE 100 MG/1
100 CAPSULE ORAL 3 TIMES DAILY PRN
Qty: 30 CAPSULE | Refills: 2 | Status: SHIPPED | OUTPATIENT
Start: 2024-07-08

## 2024-07-08 RX ORDER — ALBUTEROL SULFATE 90 UG/1
2 AEROSOL, METERED RESPIRATORY (INHALATION) EVERY 6 HOURS PRN
Qty: 18 G | Refills: 0 | Status: SHIPPED | OUTPATIENT
Start: 2024-07-08

## 2024-07-08 ASSESSMENT — PATIENT HEALTH QUESTIONNAIRE - PHQ9
SUM OF ALL RESPONSES TO PHQ QUESTIONS 1-9: 0
10. IF YOU CHECKED OFF ANY PROBLEMS, HOW DIFFICULT HAVE THESE PROBLEMS MADE IT FOR YOU TO DO YOUR WORK, TAKE CARE OF THINGS AT HOME, OR GET ALONG WITH OTHER PEOPLE: NOT DIFFICULT AT ALL
SUM OF ALL RESPONSES TO PHQ QUESTIONS 1-9: 0

## 2024-07-08 ASSESSMENT — PAIN SCALES - GENERAL: PAINLEVEL: NO PAIN (0)

## 2024-07-08 NOTE — PROGRESS NOTES
Preoperative Evaluation  [facility]  [address] [place] [address]  Phone: [phone]  Fax: [phone]  Primary Provider: [facility]  Pre-op Performing Provider: KYLE Freire CNP  Jul 8, 2024 7/8/2024   Surgical Information   What procedure is being done? cataract surg left eye   Facility or Hospital where procedure/surgery will be performed: [facility]   Who is doing the procedure / surgery? carmen alfonso md   Date of surgery / procedure: [id number]   Time of surgery / procedure: am   Where do you plan to recover after surgery? at home alone      [facility]   [phone] fax   Fax number for surgical facility: patient will drop off    Assessment & Plan     The proposed surgical procedure is considered LOW risk.    Problem List Items Addressed This Visit    None  Visit Diagnoses       Pre-op exam    -  Primary    Relevant Orders    Basic metabolic panel  (Ca, Cl, CO2, Creat, Gluc, K, Na, BUN) (Completed)    CBC with platelets (Completed)    TSH with free T4 reflex (Completed)    Age-related cataract of both eyes, unspecified age-related cataract type        Cough, unspecified type        Relevant Medications    benzonatate (TESSALON) 100 MG capsule    albuterol (PROAIR HFA/PROVENTIL HFA/VENTOLIN HFA) 108 (90 Base) MCG/ACT inhaler    Cervical cancer screening        Visit for screening mammogram        Screen for colon cancer                        - No identified additional risk factors other than previously addressed    Antiplatelet or Anticoagulation Medication Instructions   - Patient is on no antiplatelet or anticoagulation medications.    Additional Medication Instructions   - ibuprofen (Advil, Motrin): DO NOT TAKE 1 day before surgery.     Recommendation  Approval given to proceed with proposed procedure, without further diagnostic evaluation.    Fiorella Mott is a 58 year old,  presenting for the following:  Pre-Op Exam          7/8/2024     4:41 PM   Additional Questions   Roomed by Vilma         7/8/2024   Forms   Any forms needing to be completed Yes      HPI related to upcoming procedure:         7/8/2024   Pre-Op Questionnaire   Have you ever had a heart attack or stroke? No   Have you ever had surgery on your heart or blood vessels, such as a stent placement, a coronary artery bypass, or surgery on an artery in your head, neck, heart, or legs? No   Do you have chest pain with activity? No   Do you have a history of heart failure? No   Do you currently have a cold, bronchitis or symptoms of other infection? No   Do you have a cough, shortness of breath, or wheezing? No   Do you or anyone in your family have previous history of blood clots? No   Do you or does anyone in your family have a serious bleeding problem such as prolonged bleeding following surgeries or cuts? No   Have you ever had problems with anemia or been told to take iron pills? No   Have you had any abnormal blood loss such as black, tarry or bloody stools, or abnormal vaginal bleeding? No   Have you ever had a blood transfusion? No   Are you willing to have a blood transfusion if it is medically needed before, during, or after your surgery? (!) NO    Have you or any of your relatives ever had problems with anesthesia? No   Do you have sleep apnea, excessive snoring or daytime drowsiness? No   Do you have any artifical heart valves or other implanted medical devices like a pacemaker, defibrillator, or continuous glucose monitor? No   Do you have artificial joints? No   Are you allergic to latex? No        Health Care Directive  Patient does not have a Health Care Directive or Living Will: Discussed advance care planning with patient; however, patient declined at this time.    Preoperative Review of    reviewed - no record of controlled substances prescribed.      Status of Chronic Conditions:  See problem list  for active medical problems.  Problems all longstanding and stable, except as noted/documented.  See ROS for pertinent symptoms related to these conditions.    Patient Active Problem List    Diagnosis Date Noted    Lumbar radiculopathy 03/03/2022     Priority: Medium    Hypoxemia 04/22/2020     Priority: Medium    Viral respiratory infection 04/22/2020     Priority: Medium    Hypertension goal BP (blood pressure) < 140/90 09/09/2019     Priority: Medium    Migraine without aura and without status migrainosus, not intractable 09/09/2019     Priority: Medium    Other insomnia 12/22/2015     Priority: Medium    Major depressive disorder, recurrent, severe without psychotic features (H) 12/22/2015     Priority: Medium    CARDIOVASCULAR SCREENING; LDL GOAL LESS THAN 160 03/07/2015     Priority: Medium    Depression with anxiety 01/30/2015     Priority: Medium    Major depressive disorder, recurrent episode, severe (H) 01/30/2015     Priority: Medium     Problem list name updated by automated process. Provider to review        Past Medical History:   Diagnosis Date    History of cervical dysplasia      Past Surgical History:   Procedure Laterality Date    CONIZATION CERVIX,KNIFE/LASER       Current Outpatient Medications   Medication Sig Dispense Refill    acetaminophen (TYLENOL) 500 MG tablet Take 2 tablets (1,000 mg) by mouth every 6 hours as needed for mild pain      benzonatate (TESSALON) 100 MG capsule Take 1 capsule (100 mg) by mouth 3 times daily as needed for cough 30 capsule 2    amitriptyline (ELAVIL) 25 MG tablet Take 1 tablet (25 mg) by mouth At Bedtime for 7 days, THEN 2 tablets (50 mg) At Bedtime for 23 days. 53 tablet 0    butalbital-acetaminophen-caffeine (ESGIC) -40 MG tablet TAKE ONE TABLET BY MOUTH EVERY 6 HOURS AS NEEDED FOR HEADACHE DO NOT EXCEED 4000MG ACETAMINOPHEN/DAY (Patient not taking: Reported on 7/8/2024) 30 tablet 0    cyclobenzaprine (FLEXERIL) 10 MG tablet Take 1 tablet (10 mg) by  "mouth 3 times daily as needed for muscle spasms (Patient not taking: Reported on 7/8/2024) 21 tablet 1    EPINEPHrine (ANY BX GENERIC EQUIV) 0.3 MG/0.3ML injection 2-pack Inject 0.3 mg into the muscle as needed for anaphylaxis (Patient not taking: Reported on 7/8/2024)      Lidocaine (LIDOCARE) 4 % Patch Place 1 patch onto the skin every 24 hours To prevent lidocaine toxicity, patient should be patch free for 12 hrs daily. (Patient not taking: Reported on 7/8/2024) 10 patch 0    traZODone (DESYREL) 50 MG tablet TAKE ONE TO TWO TABLETS BY MOUTH AT BEDTIME AS NEEDED FOR SLEEP (Patient not taking: Reported on 7/8/2024) 180 tablet 0    zolpidem (AMBIEN) 10 MG tablet TAKE ONE TABLET BY MOUTH AT BEDTIME AS NEEDED FOR SLEEP **MAY TAKE UP TO 3 TIMES A WEEK (Patient not taking: Reported on 7/8/2024) 12 tablet 5       Allergies   Allergen Reactions    Effexor [Venlafaxine]      nausea    Topamax [Topiramate]     Topamax [Topiramate]      \"crazy\"        Social History     Tobacco Use    Smoking status: Never    Smokeless tobacco: Never   Substance Use Topics    Alcohol use: Not Currently     Comment: occ     Family History   Problem Relation Age of Onset    Coronary Artery Disease Mother         heart attack in sleep    Breast Cancer Sister      History   Drug Use No             Review of Systems  Constitutional, HEENT, cardiovascular, pulmonary, gi and gu systems are negative, except as otherwise noted.    Objective    /80 (BP Location: Right arm)   Pulse 112   Temp 98  F (36.7  C) (Tympanic)   Resp 20   Ht 1.524 m (5')   Wt 73.5 kg (162 lb)   LMP 11/05/2010   SpO2 96%   BMI 31.64 kg/m     Estimated body mass index is 31.64 kg/m  as calculated from the following:    Height as of this encounter: 1.524 m (5').    Weight as of this encounter: 73.5 kg (162 lb).  Physical Exam  GENERAL: alert and no distress  EYES: Eyes grossly normal to inspection, PERRL and conjunctivae and sclerae normal  HENT: ear canals and TM's " "normal, nose and mouth without ulcers or lesions  NECK: no adenopathy, no asymmetry, masses, or scars  RESP: lungs clear to auscultation - no rales, rhonchi or wheezes  CV: regular rate and rhythm, normal S1 S2, no S3 or S4, no murmur, click or rub, no peripheral edema  ABDOMEN: soft, nontender, no hepatosplenomegaly, no masses and bowel sounds normal  MS: no gross musculoskeletal defects noted, no edema  SKIN: no suspicious lesions or rashes  NEURO: Normal strength and tone, mentation intact and speech normal  PSYCH: mentation appears normal, affect normal/bright    No results for input(s): \"HGB\", \"PLT\", \"INR\", \"NA\", \"POTASSIUM\", \"CR\", \"A1C\" in the last 8760 hours.     Diagnostics  Recent Results (from the past 48 hour(s))   Basic metabolic panel  (Ca, Cl, CO2, Creat, Gluc, K, Na, BUN)    Collection Time: 07/08/24  5:14 PM   Result Value Ref Range    Sodium 140 135 - 145 mmol/L    Potassium 4.3 3.4 - 5.3 mmol/L    Chloride 101 98 - 107 mmol/L    Carbon Dioxide (CO2) 29 22 - 29 mmol/L    Anion Gap 10 7 - 15 mmol/L    Urea Nitrogen 8.2 8.0 - 23.0 mg/dL    Creatinine 0.84 0.51 - 0.95 mg/dL    GFR Estimate 80 >60 mL/min/1.73m2    Calcium 9.9 8.6 - 10.0 mg/dL    Glucose 86 70 - 99 mg/dL   CBC with platelets    Collection Time: 07/08/24  5:14 PM   Result Value Ref Range    WBC Count 7.8 4.0 - 11.0 10e3/uL    RBC Count 4.82 3.80 - 5.20 10e6/uL    Hemoglobin 14.0 11.7 - 15.7 g/dL    Hematocrit 43.2 35.0 - 47.0 %    MCV 90 78 - 100 fL    MCH 29.0 26.5 - 33.0 pg    MCHC 32.4 31.5 - 36.5 g/dL    RDW 12.5 10.0 - 15.0 %    Platelet Count 317 150 - 450 10e3/uL   TSH with free T4 reflex    Collection Time: 07/08/24  5:14 PM   Result Value Ref Range    TSH 4.02 0.30 - 4.20 uIU/mL      No EKG required, no history of coronary heart disease, significant arrhythmia, peripheral arterial disease or other structural heart disease.    Revised Cardiac Risk Index (RCRI)  The patient has the following serious cardiovascular risks for " perioperative complications:   - No serious cardiac risks = 0 points     RCRI Interpretation: 0 points: Class I (very low risk - 0.4% complication rate)         Signed Electronically by: KYLE Freire CNP  Copy of this evaluation report is provided to requesting physician.        Answers submitted by the patient for this visit:  Patient Health Questionnaire (Submitted on 7/8/2024)  If you checked off any problems, how difficult have these problems made it for you to do your work, take care of things at home, or get along with other people?: Not difficult at all  PHQ9 TOTAL SCORE: 0

## 2025-07-13 ENCOUNTER — HEALTH MAINTENANCE LETTER (OUTPATIENT)
Age: 60
End: 2025-07-13